# Patient Record
Sex: MALE | Race: WHITE | Employment: FULL TIME | URBAN - METROPOLITAN AREA
[De-identification: names, ages, dates, MRNs, and addresses within clinical notes are randomized per-mention and may not be internally consistent; named-entity substitution may affect disease eponyms.]

---

## 2018-12-05 ENCOUNTER — TRANSFERRED RECORDS (OUTPATIENT)
Dept: HEALTH INFORMATION MANAGEMENT | Facility: CLINIC | Age: 23
End: 2018-12-05

## 2018-12-21 ENCOUNTER — TRANSFERRED RECORDS (OUTPATIENT)
Dept: HEALTH INFORMATION MANAGEMENT | Facility: CLINIC | Age: 23
End: 2018-12-21

## 2018-12-27 ENCOUNTER — TELEPHONE (OUTPATIENT)
Dept: PSYCHIATRY | Facility: CLINIC | Age: 23
End: 2018-12-27

## 2018-12-27 NOTE — TELEPHONE ENCOUNTER
PSYCHIATRY CLINIC PHONE INTAKE     SERVICES REQUESTED / INTERESTED IN          Med Management    Presenting Problem and Brief History                              What would you like to be seen for? (brief description): Pt was referred by his psychiatrist who felt like he needed to be evaluated. He had an episode in the past, but his episodes have been managed by Abilify. He has had depression and anxiety for over 10 years. He started the Abilify 2 months ago and its helped a lot, but he doesn't feel anything anymore, he feels neurtral and emotionless. His first episode started about a year ago. He had major depression and paranoia which led to a suicide attempt and he was admitted to the hospital. Hallucinations are very rare, they started at year ago. He has auditory and visual but they are mostly auditory. The medication works well to keep him out of the depressive phases but when he had them it would last for a day. He gets a full night sleep, but wakes feeling tired and unrested. When he was released from the hospital and started seeing a psychiatrist. His current psychiatrist is interested in a second opinion to make sure his meds are working properly. He takes 15 mg of Abilify per day and Lexapro 30 mg per day.     Have you received a mental health diagnosis? Yes   Which one (s): Depression and Anxiety  Is there any history of developmental delay?  No   Are you currently seeing a mental health provider?  Yes            Who / month last seen:  Ron Garner at [facility]. Last seen on 12/21/18  Do you have mental health records elsewhere?  Yes  Will you sign a release so we can obtain them?  Yes    Have you ever been hospitalized for psychiatric reasons?  Yes  Describe:  See above    Do you have current thoughts of self-harm?  Not right now  Do you currently have thoughts of harming others?  No       Substance Use History     Do you have any history of alcohol / illicit drug use?  No  Describe:   NA  Have you ever received treatment for this?  No    Describe:  NA     Social History     Does the patient have a guardian?  Yes    Name / number: Frida Gibson (Parents) - [phone] (mom cell)  Have you had an ACT team in last 12 months?  No  Describe: NA   Do you have any current or past legal issues?  No  Describe: NA   OK to leave a detailed voicemail?  Yes    Medical/ Surgical History                                 There is no problem list on file for this patient.         Medications             No current outpatient medications on file.         DISPOSITION      12/27/18 Intake completed. Ok to schedule for FE appt. Scheduled w/ Eden Green on 1/8/19 at 12:00pm.  Melisa Hale,

## 2019-01-08 ENCOUNTER — OFFICE VISIT (OUTPATIENT)
Dept: PSYCHIATRY | Facility: CLINIC | Age: 24
End: 2019-01-08
Attending: SOCIAL WORKER
Payer: COMMERCIAL

## 2019-01-08 DIAGNOSIS — F32.3 MAJOR DEPRESSIVE DISORDER WITH PSYCHOTIC FEATURES (H): Primary | ICD-10-CM

## 2019-01-08 NOTE — Clinical Note
"Hi Roselia-Just reviewing your DA documentation.  Looks really good.  If he is going to return to his therapist in [place], it would be important for you to contact the therapist and provide a warm handoff of our assessment and recommendations.  I forwarded you an inbasket message with a records request and needed follow up.  Other thoughts-I think you could elaborate more on the prodromal period.  I have a smartphrase to help formulate your thoughts and you can delete out the rest:  . FEPprodromesx  Lastly, diagnosis.  Once you have one identified, you'll need to identify that in the \"Plan\" button to the left of the encounter.  Be sure to also enter billing code under the \"Wrap-up\" level of service.  "

## 2019-01-08 NOTE — PROGRESS NOTES
"First Episode Psychosis   [facility]  Diagnostic Assessment  [facility]      Madhu Gibson MRN# [id number]   Age: 23 year old YOB: 1995     Date of Evaluation: 1/08/19  90 minute evaluation    Contributors to the Assessment   Chart Reviewed.   Interview completed with Madhu Gibson.  Releases of information signed by Madhu for [facility](psychiatry)  Collateral information obtained from Madhu's mother, Caitlin Gibson.    Chief Complaint   \"I want to get my depression under control. \"    History of Present Illness    Madhu Gibson is a 23 year old male who presents for evaluation for First Episode of Psychosis, [facility] services for treatment of early psychosis.   Madhu initially received cognitive testing in fourth grade at the [facility] [employer] [facility] [employer] in [facility].  During that time, he was displaying behaviors at home and in school surrounding excessive worry and anger issues.  Ultimately, he was diagnosed with an anxiety disorder and it was recommended that a 504 plan be implemented at school. Madhu  indicated that he was was first diagnosed with Major Depression at age [age].  He began taking Lexapro several years ago to manage his symptoms. He saw his depressive symptoms intensify around age 20.5-22 when he was working full-time at [employer].   He was unable to identify a trigger that made the symptoms intensify, other than possible stress related to staffing issues at work. Madhu was hospitalized at [facility] in December 2017 following a suicide attempt via stabbing himself in the thigh.  After his hospitalization(age 22), he began to experience visual and auditory hallucinations. He kept the psychotic symptoms to himself for six months, until getting help in June 2018.  During that time, he started Abilify and it has helped.  Madhu has since seen two psychiatrists, and they have hypothesized that he may " "either be experiencing negative symptoms from schizophrenia or Major Depression with psychotic features.  Madhu reported that he believes that  Abilify causes him to often feels \"flat\" without any happiness or sadness. He recently switched to Olanzapine, and is increasing the dosage to 10mg. Madhu continues to have auditory hallucinations on a daily basis, but indicates that they are incoherent whispers.  He occasionally has visual hallucinations in the form of a black shadowy figure.  He reported delusions that he often believes that others are watching and following him.  He reports that he is able to self-regulate and do reality testing with the hallucinations, and that his priority pressing issue is the depressive symptoms.      Psychiatric Review of Systems (Completed M.I.N.I. Version 7.0.2: Yes)   A. DEPRESSION:  Past 2 Weeks: depressed mood, restlessness, feelings of worthlessness; thoughts of death(no plan or intent);distress of life areas; reports,\"I don't feel high or low, I just feel flat and numb\". Past Episode:  depressed mood, restlessness, feelings of worthlessness; thoughts of death(no plan or intent);distress of life areas;   B. SUICIDALITY: Madhu reports having \"fleeting thoughts\" approximately 1-2 times per week that he would be better off dead.  He indicates that he does not have a plan or intent to attempt suicide, and that he redirects himself with distractive techniques.  In December 2017, Madhu reports that he had a suicide attempt by stabbing himself in the thigh.  He indicated that during this time he called his sister, who escorted him to [facility] where he was for 2-3 days.  He indicated that he did not sustain any severe injury from the stab wound site.   Risk: Low: Denies suicidal intent or plan. Madhu reported on a scale 0-100%, he would be 10% likely to attempt suicide within the next three months.   C. WALT/HYPOMANIA:  Current Episode: none; Past Episode: " none  DENIES Current Episode: none; DENIES Past Episode: none   D. PANIC:  Reports having occasional panic attacks that result in heart racing, sweating/clammy hands, trembling, choking sensation, chest pain, and nausea.  He said during these incidents he feels as if he is losing control or going crazy.  Madhu reported that he has not had a panic attack for the past 2 or 3 years.   E. AGORAPHOBIA:  None  F. SOCIAL ANXIETY:  none   G. OBSESSIVE-COMPULSIVE:  none  H. TRAUMA:  none  I. ALCOHOL & J. NON-ALCOHOL:  Madhu drinks alcohol on an occasional basis, approximately 1 drink/week. See CAGE-AID below  K. PSYCHOSIS: Madhu reports that he is often paranoid, and believes that others are watching and following him.  He indicated that he last felt this way last week.  He has auditory hallucinations on a daily basis, and last experienced them this morning.  He has been having hallucinations since December 2017.  He reported that they are multiple voices that are whispering and the volume gets louder.  He said that most of the time he cannot make out what they are saying. He reported that he occasionally has visual hallucinations, and it is a black shadowy figure that appears in his peripheral vision.  He mentioned that the visual hallucinations are somewhat rare.  He is able to differentiate between reality and hallucinations, and can self-regulate without hospitalization during these times.   Madhu indicated that the hallucinations are stable throughout the day, and do not fluctuate based on his mood.  DENIES- catatonic symptoms and disorganized speech  L-M. EATING DISORDER:  none   N. GENERALIZED ANXIETY:    Madhu reported excessive anxiousness and worry over the past six months, and that they are present most days.  He indicated that this leads to restlessness and exhaustion, but does not experience muscle tension, difficulty concentrating, irritability, or difficulty sleeping.  He did not report that the anxiety and worry  "disrupt major areas of functioning in his life.   (O. RULE OUT MEDICAL, ORGANIC OR DRUG CAUSES FOR ALL DISORDERS)  P. ANTISOCIAL PERSONALITY:  none    Past Psychiatric History   Past diagnoses:General Anxiety Disorder, Major Depression   When did you first start experiencing psychotic symptoms: About a year ago(age 22)  Past Medication Trials: See recent [facility] visit with Sydnee Olvera    Hospitalizations: 12/2017 [facility]  Commitment: No, Current [place] order: No  ECT trials: No    Suicide attempts: Yes - Stabbed self in thigh Dec 2017  Self-injurious behavior: No  Violent behavior: No    Current Outpatient Programs & Services:  Psychotherapy: on hold (previous with Vincent Burgos, [facility])  Medication Mgmt: Dr. Scott Hyde/[facility]     Substance Use History: (review CAGE-AID assessment)   Madhu reported that he has never experimented with substance use other than consumption of occasional alcoholic beverage(1 drink/week)    CD treatment hx: No  Withdrawal hx: No  Current sober supports include n/a.         Past Medical History:    Primary Care Physician: Dontrell Baltazar A    History of seizures or head trauma/loss of consciousness? No           Allergies:    Allergies not on file         Medications:     No current outpatient medications on file.             Social History:    Living situation: Madhu lives with three roommates, in a Private Apartment in [place], [place].   Madhu has a supportive family, and both parents accompanied him to the clinic for the diagnostic assessment.  Madhu has an older sister, Steph, who lives with their parents.      Education: Madhu is not currently attending school;  He graduated high school in 2015 and completed some college.   During high-school, he was in mainstream classes and received average grades.  He described himself as a \"quiet student.\"  He also studied Computer Science in college for 1.5 years  before withdrawing.     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   Madhu reported that he has been on medical leave in the past for mental health reasons, and has started a new leave four days ago.     Finances: Madhu is financially supported by his payroll at [email or web address].     Relationships: Significant relationships include sister(Steph), parents, and his best-friend. Madhu does not  have a significant other.     Spiritual considerations:  Patient does not identify with trudi community    Cultural influences: Madhu identifies his race as . Madhu's primary language is English. Madhu identifies his sexual orientation as heterosexual, and his gender as male.  Madhu prefers he pronouns.  Madhu reports  No  to other cultural considerations to take into account when providing treatment.     Legal Hx: No    Trauma and/or Abuse Hx: No     Hx: No        Developmental History:   Madhu was born without pregnancy or delivery complications.   Madhu's parent/guardian denies in utero substance exposure. Madhu did meet developmental milestones on time. Madhu did not receive interventions for developmental delays. Madhu received cognitive testing in 4th grade after experiencing behavioral issues at school related to anger and anxiety.  Ultimately, he did not require an IEP during school.         Family History:   Family history of: Major Depression (sister, mom). Madhu's sister also has had a suicide attempt, and has been diagnosed with borderline personality disorder. Madhu's maternal side of the family has history of depression and bipolar disorder.         Most Recent Labs & Vitals (per EPIC):   No lab results found.  There were no vitals taken for this visit.    Screening/Assessment Measures   PHQ9 was completed today, 1/08/19  Scored at 6    GAD7 was completed today, 1/08/19  6 out of 21, indicating mild anxiety.    CAGE-AID was completed today, 1/08/19  None of the  patient's responses to the CAGE screening were positive / Negative CAGE score    Mental Status Exam   Alertness: alert   Appearance: adequately groomed  Behavior/Demeanor: cooperative, with fair  eye contact   Speech: normal  Language: good. Preferred language identified as English.  Psychomotor: normal or unremarkable  Mood: depressed  Affect: appropriate  Thought Process/Associations: unremarkable  Thought Content:  Reports paranoid ideation;  Denies obsessions   Perception:  Reports Auditory and Visual Hallucinations, Delusions    Insight: good  Judgment: good  Cognition: oriented: time, person, and place; formal cognitive testing was not done  Suicidal ideation: reports SI, denies intent,  and denies plan  Homicidal Ideation: denies    Provisional Psychiatric Diagnoses (M.I.N.I. 7.0.2 assessment)   F32.3 Major Depression, Recurrent episode, Severe with Psychotic Features        Assessment   Madhu Gibson is a 23 year old single (never )  male with psychiatric history of General Anxiety Disorder, Major Depression, suicide attempt, and a recent onset of psychosis who presented for a comprehensive assessment of psychotic symptoms by the First Episode of Psychosis Strengths Program.  Madhu was referred by his psychiatrist for comprehensive assessment. Madhu presented today as a Good historian with Good insight. The above duration of untreated psychosis was approximately six months.    It seems possible that prodromal symptoms may have began about a year ago when Madhu had a suicide attempt after experiencing stressors at work.    He went on a medical leave from work during this time, and began experiencing hallucinations/delusions soon after.    Diagnosis of Major Depression with Psychotic Features seems supported by patient report, collateral report, and the MINI 7.0.2. There are no medical comorbidities which impact this treatment     Psychosocial & Contextual Factors include Occupational  "Difficulties. Madhu has evidence of occupational decline, as he recently took another medical leave from work.  Previously, stressors related to work led to increased depressive symptoms and prompted the first medical leave. The goal is to increase social/vocational/occupational functioning detailed above in social history.  Identified risk factors and/or vulnerabilities include lack of structure/purposeful activity while on medical leave leading to increased depressive symptoms    Protective factors and/or strengths identified as has a stable living environment, has a supportive family, insight into mental health, and endorses future oriented thoughts.  Madhu is able to maintain long-term employment at [employer] full-time, and his supervisors are supportive of his mental health needs. Madhu has medical insurance and is motivated to be an active participant in his treatment planning.    Madhu is currently engaged in services in the community; including psychiatry and therapy. Writer has provided verbal and/or written information about the First Episode of Psychosis- Strengths Program and available services to consider.   Madhu and his family are interested in participating in the comprehensive assessment to provide information to his local providers, but would not like to enroll in NAVIGATE or STRENGTHS programs due to their location of residency in [place].     Madhu agrees to treatment with the capacity to do so. Agrees to call clinic for any problems. The patient understands to call 911 or come to the nearest ED if life threatening or urgent symptoms present.    Billing for \"Interactive Complexity\"?    No    Plan   Medication: Madhu was agreeable to seeing a Strengths medication prescriber.     Psychotherapy: Madhu was not interested in meeting with a therapist, as he may return to a therapist that he is established with in [place].      Supported Employment & Education: Madhu is employed full-time at [employer], and is " not in need of employment and education support.    Case Management: Madhu is not followed by a .  Case Management is not an identified need at this time.    Other Psychosocial Supports:  Due to location, Madhu is not interested in the  Young Adult Group. Family was provided information for  Family Psychoeducation & Support Group (Family email address [email or web address]).     Medical Referrals: n/a    Safety: Without the recommended intervention, Madhu is likely to experience possible increase in psychotic symptoms requiring hospitalization. Today Madhu denies SI, denies intent,  and denies plan. In addition, there are notable risk factors for self-harm, including suicidal ideation. However, risk is mitigated by history of seeking help when needed, commitment to family, future-oriented thoughts, and insight into mental health. Therefore, based on all available evidence including the factors cited above, Madhu does not appear to be at imminent risk for self-harm, does not meet criteria for a 72-hr hold, and therefore remains appropriate for ongoing outpatient level of care.  The patient convincingly denies suicidality on several occasions. There was no deceit detected, and the patient presented in a manner that was believable.  Safety plan was not discussed and included review of crisis phone numbers within the county of residence, and examples of when to contact them.  Additionally, discussed seeking assistance via 911 or local ED should patient begin to feel unsafe and have increased feelings of suicide.      HIPOLITO Payan Candidate        Attestation:  I have reviewed this note, but did not meet with the patient directly. This patient is discussed with me in individual social work supervision. I agree with the plan as documented.  HIPOLITO Villagomez, [facility],  January 31, 2019    I did not see this pt directly. This pt was discussed with me in individual clinical social work supervision,  and I agree with the plan as documented.    Veronica Wolfe, HIPOLITO, LICSW February 7, 2019

## 2019-01-18 ENCOUNTER — TELEPHONE (OUTPATIENT)
Dept: PSYCHIATRY | Facility: CLINIC | Age: 24
End: 2019-01-18

## 2019-01-18 ENCOUNTER — OFFICE VISIT (OUTPATIENT)
Dept: PSYCHIATRY | Facility: CLINIC | Age: 24
End: 2019-01-18
Attending: PSYCHOLOGIST
Payer: COMMERCIAL

## 2019-01-18 ENCOUNTER — OFFICE VISIT (OUTPATIENT)
Dept: PHARMACY | Facility: CLINIC | Age: 24
End: 2019-01-18
Payer: COMMERCIAL

## 2019-01-18 DIAGNOSIS — F29 PSYCHOSIS, UNSPECIFIED PSYCHOSIS TYPE (H): Primary | ICD-10-CM

## 2019-01-18 DIAGNOSIS — E56.9 VITAMIN DEFICIENCY: ICD-10-CM

## 2019-01-18 PROCEDURE — 99605 MTMS BY PHARM NP 15 MIN: CPT | Performed by: PHARMACIST

## 2019-01-18 PROCEDURE — 99607 MTMS BY PHARM ADDL 15 MIN: CPT | Performed by: PHARMACIST

## 2019-01-18 RX ORDER — LORATADINE 10 MG/1
10 TABLET ORAL PRN
Status: ON HOLD | COMMUNITY
Start: 2011-03-14 | End: 2019-02-01

## 2019-01-18 RX ORDER — OLANZAPINE 10 MG/1
10 TABLET ORAL AT BEDTIME
Refills: 0 | Status: ON HOLD | COMMUNITY
Start: 2018-12-28 | End: 2019-02-01

## 2019-01-18 RX ORDER — ESCITALOPRAM OXALATE 20 MG/1
30 TABLET ORAL AT BEDTIME
Status: ON HOLD | COMMUNITY
Start: 2018-08-03 | End: 2019-02-01

## 2019-01-18 NOTE — PATIENT INSTRUCTIONS
Recommendations from today's MTM visit:                                                        1. Ok to start OTC fishoil and NAC (N-acetylcystine). For NAC aim for 2-2.4g total per day, you can take two times daily. For fish oil, aim for 2g EPA+DHA total per day, you can also take this two times daily. A good brand for fish oil is [employer] [employer] Ultimate Omega.     2. Next appt in clinic is 2/1/19 with Dr. Mendoza    To schedule another MTM appointment, please call the clinic directly or you may call the MTM scheduling line at [phone] or toll-free at [phone].     My Clinical Pharmacist's contact information:                                                      It was a pleasure talking with you today!  Please feel free to contact me with any questions or concerns you have.      Sydnee Olvera, PharmD, BCPP  Medication Therapy Management Pharmacist  [facility]    You may receive a survey about the MTM services you received.  I would appreciate your feedback to help me serve you better in the future. Please fill it out and return it when you can. Your comments will be anonymous.

## 2019-01-18 NOTE — TELEPHONE ENCOUNTER
On 1/18/2019, 32 pages of records were received from [facility]. This writer put the records in [facility]'s folder upfront and this was routed to [facility].  I sent the documents to scanning on 1/18/2019 and held a copy in Psychiatry until scanning is confirmed. Jenae Luis, CMA

## 2019-01-18 NOTE — PROGRESS NOTES
SUBJECTIVE/OBJECTIVE:                           Madhu Gibson is a 23 year old male coming in for an initial visit for Medication Therapy Management.  He is here today with his father, Brian.  He was referred to me from First Episode Psychosis (FEP[facility].      Chief Complaint: FEP intake and medication review.    Allergies/ADRs: Reviewed in Epic  Tobacco: see DA, not discussed today.   Alcohol: see DA, not discussed today.   PMH: unremarkable    Medication Adherence/Access:  no issues reported    Psychosis, NEC: Pt here today for continuation of FEP intake. He was referred to FEP program by Dr. Duong in [place].  Diagnostic Assessment (DA) completed with [facility] on 1/8/19. MTM visit and cognitive testing today. Initial psychiatry provider visit with Edgard Mendoza scheduled for 2/1/19.     Current therapy includes: Lexapro 30 mg daily, olanzapine 10 mg nightly, and metformin 500 mg daily (prescribed twice daily) to prevent antipsychotic-induced weight gain.    See 1/8/2019 DA for details regarding symptoms and history. During visit today, Madhu reported onset of psychosis symptoms starting ~1 year ago. He experienced auditory hallucinations of whispering. AH gradually worsened and were occurring daily.  He also endorsed visual hallucinations of shadowy figures and paranoia regarding feeling like people were watching him.    Madhu reports improvement in A/V hallucinations and paranoia starting ~Sept 2018 with initiation of Abilify therapy.  Abilify was titrated to 15mg, however was subsequently cross-tapered to Zyprexa in Dec 2018. Madhu and father are unclear as to why this medication change was made.  Both feel Abilify therapy was effective and dad recalls pt was more alert and less sedated with Abilify. Madhu does not recall experiencing any Abilify side effects. Review of outside records indicate he may have experienced some restless sleep with Abilify (was taking QHS).     Currently, Madhu  reports auditory hallucinations occurring 3-4 times per week (reduced from daily AH) and almost complete resolution of VH.  Madhu states his most problematic symptoms at this time are decreased interest, low energy/sedation, low motivation, low mood.  He was told these could be depression symptoms or negative symptoms of psychosis.  His primary goal is improving these negative symptoms and getting back to his job at [employer].    In terms of current therapy, he has been on olanzapine since 12/28/19 and on the current dose of 10 mg for ~2 weeks.  He endorses significant daytime sedation and is sleeping from 12AM-3/4PM.  He denies any other olanzapine side effects including increased appetite.  Metformin was started at the same time as olanzapine to prevent weight gain.  He is only taking it once daily d/t only eating 1 meal per day.  He has been on Lexapro for several years and has been at current dose of 30 mg daily for at least 1 year.  He feels Lexapro was helpful for depression when initially started.    Past medication trials:     Antipsychotics:   - Abilify up to 15mg daily, 3 month duration (9/2018-12/2018).   - Zyprexa up to 10mg (current)    Antidepressants:   - Lexapro up to 30mg daily, duration of several years (current)     Supplements/Vitamin D deficiency: Currently taking vitamin D 5000units daily- recommended by pt's psychiatrist d/t low vitamin D level (17ng/ml on 12/14/18).  Dad asks if there are other vitamins or supplements that would be helpful for Madhu's current symptoms.      ASSESSMENT:                             Current medications were reviewed today.     Medication Adherence: good    Psychosis, NEC: Current therapy with Zyprexa+Lexapro is somewhat effective, however is causing side effect of significant sedation. Madhu is sleeping 15-16 hours per night.  He reportedly felt better on Abilify therapy.  Reviewed available outside records (scanned into media tab), however documentation does not  include information regarding Abilify to Zyprexa switch.  Unclear as to reason(s) Abilify was stopped, but suspect restlessness with QHS administration and some ongoing, yet improved, AH.     Provided psychoeducation to Madhu and dad regarding psychosis and antipsychotic medication options.  Reviewed varying rates of side effects among agents. Also discussed that sometimes sedation to Zyprexa improves with time. If pt continues to experience significant sedation, could consider trial of a less sedating antipsychotic. Depending on reason for Abilify discontinuation, could consider Abilify retrial with targeting higher dose of 20-30mg daily with QAM as opposed to QHS dosing. If trial of an alternate agent is deemed more appropriate, risperidone or Geodon are potential options as well.     If Zyprexa is continued, could consider changing metformin IR to metformin XR for improved compliance with once daily dosing.     Supplements/Vitamin D deficiency: Stable. Agree with continuing vitamin D 5000units daily based on last level. Provided psychoed on benefits of vitamin on mood symptoms. Provided education on potential benefit of fish oil and NAC on negative/cognitive symptoms in FEP.  Discussed proposed mechanisms of action (anti-inflammatory, glutamate action), dosing, and efficacy shown in some studies.  Goal dose for fish oil is 2g EPA+DHA/per day and 1800mg to 2400mg per day of NAC. Both are available OTC. Fish oil formulations/concentrations vary widely and Powered is a recommended high potency EPA/DHA brand of FO. Brian expressed understanding with information provided.     PLAN:                            1. Will discuss medication options with FEP team. Given significant sedation with Zyprexa, could consider Abilify retrial or another less-sedating SGA.     2. If metformin is continued for prevention of SGA induced weight gain, may consider changing to XR formulation for improved compliance.     3. Provided  information on fish oil and NAC for negative/cognitive psychosis symptoms.     I spent 60 minutes with this patient today. A copy of the visit note was provided to the patient's FEP provider.    Will follow up in 3 weeks - FEP Explanation of Findings Visit.    The patient was given a summary of these recommendations as an after visit summary.     Chart documentation was completed in part with Dragon voice-recognition software. Even though reviewed, some grammatical, spelling, and word errors may remain.    Sydnee Olvera, PharmD, BCPP  Medication Therapy Management Pharmacist  [facility]  [phone]

## 2019-01-18 NOTE — PROGRESS NOTES
"Name: Madhu Gibson  MRN: [id number]  Date: 01/18/2019  Time: 8:15am  Location: [facility]  Diagnosis: F29 Psychosis  Length: 90 minutes cognitive test administration; 45 minutes scoring & interpretation      Reason for Referral: Madhu was referred to the First Episode Strengths Program by his psychiatrist for diagnostic clarification.  He reports experiencing anxiety, mood symptoms and several psychosis-related symptoms including visual and auditory hallucinations.  He reported that his concentration has been \"fine\" but his memory has been \"not the greatest.\"           Mental Status:                         General appearance: Casually dressed, appropriately groomed                         Mood: Reported as \"neutral,\" neutral affect                        Cognition: Formally assessed - see below                        Orientation: x3                        Insight: Moderate                        Memory: Reported as \"not the greatest\"                        Psychosis: Auditory and visual hallucinations                         Suicidal / Homicidal Thoughts: Denied      Interpretation of Findings:   Wechsler Adult Intelligence Scale-Fourth Edition (WAIS-IV):  Mr. Gibson was administered the WAIS-IV to assess his overall cognitive functioning. The WAIS-IV yields five composite scores: the Verbal Comprehension index, the Perceptual Reasoning index, the Working Memory index, the Processing Speed index, and Full Scale IQ.  Mr. Gibson appeared fatigued during testing as evidenced by frequent yawning, but requested all offers for breaks.  Mr. Gibson achieved a Full Scale IQ of 102 (95% confidence interval: 98 to 106), corresponding to the 55th percentile and Average range of ability.      On the Verbal Comprehension Index, a measure of verbal reasoning abilities, verbal concept formation, and knowledge acquired from the environment, he achieved a score in the Average range of ability (VCI composite score of 107; 68th " percentile).  Mr. Gibson performed in the Average range of ability across tasks that make up this composite. The tasks include the ability to answer general factual inquiries, define words, and draw conceptual similarities between words. As such, Mr. Gibson s nonverbal reasoning abilities are comparable to those of the average, same-aged peer.      On the Perceptual Reasoning index, which assesses non-verbal problem solving and spatial planning abilities, Mr. Gibson achieved a score in the Average range of ability (JIMBO composite score of 109, 73rd percentile).  Mr. Gibson performed in the Average range of ability across tasks that make up this composite.  These tasks required him to replicate a series of spatial designs using blocks, complete visual patterns based upon presented information and a task that had him select from multiple pieces to mentally create a desired target shape.  Mr. Gibson s perceptual reasoning abilities are comparable to those of the average, same-aged peer.      His short-term memory ability was assessed by the Working Memory index, on which he performed in the Average range of ability (WMI composite score of 102, 55th percentile).  Memory ability was assessed using a task which required the retention and manipulation of a string of numbers, as well as a relatively basic oral arithmetic exam.  Mr. Gibson s ability to store things in the short-term memory and recall later is similar to peers of the same age.    Mr. Gibson s ability to quickly process basic visual information was measured by the Processing Speed index, on which he performed in the Low Average range of ability (composite score of 84, 14th percentile).  Mr. Gibson performed in the Low Average to Average range of ability across tasks that make up this composite.  These tasks required him to visually scan and discriminate information under time constraints.  Mr. Gibson s processing speed abilities are somewhat lower compared to those of  the average, same-aged peer.        Overall, Mr. Gibson s performance on the WAIS-IV fell in the Average range of ability as compared to his same-aged peers.  Mr. Gibson s general cognitive profile suggests intact cognitive functioning, will within the normal limits for his age.    Wechsler Adult Intelligence Scale-Fourth Edition (WAIS-V):  Index/Subtest Scaled Score Index/Subtest Scaled Score         Verbal Comprehension  Perceptual Reasoning    Similarities 9 Block Design 13   Vocabulary 13 Matrix Reasoning 11   Information 12 Visual Puzzles 11    JIMBO 109   Working Memory  Processing Speed    Digit Span 9 Symbol Search  8   Arithmetic 12 Coding 6    PSI 84           Full Scale            Note: Scaled scores between 8-12 are considered to be within the average range.  It is important to note that cognitive ability is considered to be one aspect of intelligence (i.e. other aspects may include interpersonal, intrapersonal, kinesthetic, musical ability, etc.); however, this type of intellectual ability is most correlated with academic performance. In addition, such measures of cognitive ability should be considered snapshots of functioning at the current time and may be influenced by development, exposure to learning, and other factors over time.  No test is a perfect measure of abilities, skills, knowledge, or behavior. Confidence intervals represent a range of scores in which there is a 95% probability that his score would fall if he were to be tested repeatedly over time.      Self-Report Questionnaires:  Mr. Gibson self-reports experiencing a severe level of depression/sadness and lack of motivation or pleasure.  He also self-reports a moderate amount of problems with physical and sleep problems, and inability to function at work or home.  He reports a moderate level of perceived stress and no concerns with substance or alcohol use.  Mr. Gibson reports being physically active and denies nutrition  concerns.  Additionally, he self-reports few adverse childhood events and mainly utilizes the coping mechanisms of instrumental support, acceptance, and self-distraction.  Mr. Gibson reports a low level of personal wellbeing and with regard to sensory symptoms, reports some difficulty with distractibility and fatigue-stress modulation.  He reports few trauma symptoms and has strong insight.      I met this patient and participated in the Mental Status Examination portion of the appointment. Additionally, this patient was discussed with me in individual psychotherapy supervision, and I agree with the plan above as documented.  Cheyenne Gutierrez Psy.D., L.P., 01/31/2019.    Psychological testing was administered on 01/18/2019 by Lisa Horta, Doctoral Practicum Student/Psychology Learner, under my direct supervision. Total time spent in test administration and scoring by Clinical Trainee was 2 hours and 15 minutes (90 minutes administering, 15 minutes scoring and 30 minutes interpreting). See Above Clinical Trainee Note for testing details.  Cheyenne Gutierrez, 04/19/2019.

## 2019-01-23 ENCOUNTER — TELEPHONE (OUTPATIENT)
Dept: PSYCHIATRY | Facility: CLINIC | Age: 24
End: 2019-01-23

## 2019-01-23 NOTE — TELEPHONE ENCOUNTER
On 12/21/2018 the patient signed a CHIDI to release records from [facility] to [facility] for the purpose of care coordination, Exchange of information: obtain or disclose per CHIDI. This writer sent the CHIDI to medical records via tube system and kept a copy in psychiatry until scanning is complete/confirmed. Angie Brink MA

## 2019-01-25 ENCOUNTER — TELEPHONE (OUTPATIENT)
Dept: PSYCHIATRY | Facility: CLINIC | Age: 24
End: 2019-01-25

## 2019-01-31 PROBLEM — F29 PSYCHOSIS, UNSPECIFIED PSYCHOSIS TYPE (H): Status: ACTIVE | Noted: 2019-01-31

## 2019-02-01 ENCOUNTER — TELEPHONE (OUTPATIENT)
Dept: PSYCHIATRY | Facility: CLINIC | Age: 24
End: 2019-02-01

## 2019-02-01 ENCOUNTER — OFFICE VISIT (OUTPATIENT)
Dept: PSYCHIATRY | Facility: CLINIC | Age: 24
End: 2019-02-01
Attending: PSYCHIATRY & NEUROLOGY
Payer: COMMERCIAL

## 2019-02-01 ENCOUNTER — HOSPITAL ENCOUNTER (OUTPATIENT)
Facility: CLINIC | Age: 24
Discharge: HOME | End: 2019-02-01
Attending: PSYCHIATRY & NEUROLOGY | Admitting: PSYCHIATRY & NEUROLOGY
Payer: COMMERCIAL

## 2019-02-01 VITALS — DIASTOLIC BLOOD PRESSURE: 73 MMHG | HEART RATE: 83 BPM | SYSTOLIC BLOOD PRESSURE: 131 MMHG | WEIGHT: 261.8 LBS

## 2019-02-01 DIAGNOSIS — F29 PSYCHOSIS, UNSPECIFIED PSYCHOSIS TYPE (H): Primary | ICD-10-CM

## 2019-02-01 DIAGNOSIS — F32.3 MAJOR DEPRESSIVE DISORDER WITH PSYCHOTIC FEATURES (H): Primary | ICD-10-CM

## 2019-02-01 PROBLEM — R45.851 SUICIDAL IDEATION: Status: ACTIVE | Noted: 2019-02-01

## 2019-02-01 PROCEDURE — 99238 HOSP IP/OBS DSCHRG MGMT 30/<: CPT | Mod: GC | Performed by: PSYCHIATRY & NEUROLOGY

## 2019-02-01 PROCEDURE — G0463 HOSPITAL OUTPT CLINIC VISIT: HCPCS | Mod: ZF

## 2019-02-01 PROCEDURE — 12400001 ZZH R&B MH UMMC

## 2019-02-01 RX ORDER — OLANZAPINE 10 MG/1
10 TABLET ORAL AT BEDTIME
Qty: 30 TABLET | Refills: 0 | Status: SHIPPED | OUTPATIENT
Start: 2019-02-01 | End: 2019-02-08

## 2019-02-01 RX ORDER — OLANZAPINE 10 MG/2ML
10 INJECTION, POWDER, FOR SOLUTION INTRAMUSCULAR
Status: DISCONTINUED | OUTPATIENT
Start: 2019-02-01 | End: 2019-02-02 | Stop reason: HOSPADM

## 2019-02-01 RX ORDER — TRAZODONE HYDROCHLORIDE 50 MG/1
50 TABLET, FILM COATED ORAL
Status: DISCONTINUED | OUTPATIENT
Start: 2019-02-01 | End: 2019-02-02 | Stop reason: HOSPADM

## 2019-02-01 RX ORDER — ACETAMINOPHEN 325 MG/1
650 TABLET ORAL EVERY 4 HOURS PRN
Status: DISCONTINUED | OUTPATIENT
Start: 2019-02-01 | End: 2019-02-02 | Stop reason: HOSPADM

## 2019-02-01 RX ORDER — LORATADINE 10 MG/1
10 TABLET ORAL PRN
Qty: 30 TABLET | Refills: 0 | Status: SHIPPED | OUTPATIENT
Start: 2019-02-01 | End: 2019-03-03

## 2019-02-01 RX ORDER — ESCITALOPRAM OXALATE 20 MG/1
30 TABLET ORAL AT BEDTIME
Qty: 45 TABLET | Refills: 0 | Status: SHIPPED | OUTPATIENT
Start: 2019-02-01 | End: 2019-02-22

## 2019-02-01 RX ORDER — HYDROXYZINE HYDROCHLORIDE 25 MG/1
25 TABLET, FILM COATED ORAL EVERY 4 HOURS PRN
Status: DISCONTINUED | OUTPATIENT
Start: 2019-02-01 | End: 2019-02-02 | Stop reason: HOSPADM

## 2019-02-01 RX ORDER — OLANZAPINE 10 MG/1
10 TABLET ORAL
Status: DISCONTINUED | OUTPATIENT
Start: 2019-02-01 | End: 2019-02-02 | Stop reason: HOSPADM

## 2019-02-01 ASSESSMENT — PAIN SCALES - GENERAL: PAINLEVEL: NO PAIN (0)

## 2019-02-01 ASSESSMENT — ACTIVITIES OF DAILY LIVING (ADL)
HYGIENE/GROOMING: INDEPENDENT
ORAL_HYGIENE: INDEPENDENT
LAUNDRY: WITH SUPERVISION
DRESS: INDEPENDENT

## 2019-02-01 NOTE — TELEPHONE ENCOUNTER
Writer also notified that pt needs letter to excuse him from work tomorrow. Writer drafted letter and placed in provider's folder for review and signature.

## 2019-02-01 NOTE — TELEPHONE ENCOUNTER
Writer received notification that pt needs to be hospitalized due to increased SI and psychosis.     Writer helped coordinate the admission process. Dr. Quarles approved of admission on station 22. Report given to DODIE Jean over the phone. Pt transferred to station 22 with parents and security.

## 2019-02-01 NOTE — PROGRESS NOTES
"[facility] PSYCHIATRY CLINIC MEDICAL DIAGNOSTIC ASSESSMENT       CARE TEAM:  PCP- Dontrell Baltazar    Specialty Providers- EFREN Lopez ([facility])    Therapist- yes, private practice in [facility] Team- no.    Madhu Gibson is a 23 year old male who prefers the name Madhu & pronouns he, him.    Referred by:  Outpatient psychiatrist  Referred for evaluation of:  Psychosis, depression  History Provided by:  patient and parents who were good historians    CHIEF COMPLAINT                                                                             \" I'm tired of feeling empty all the time \"     HISTORY OF PRESENT ILLNESS                                 4, 4      Pertinent Background: This patient first experienced symptoms of depression around the age 10 or 11 and has received treatment since that time. Limited med exposure. Developed symptoms of psychosis and had suicide attempt in December 2017 and remained depressed/psychotic for approximately 1 year before referral to first episode psychosis program here at the [facility].    Psych critical item history includes suicide attempt [single], suicidal ideation, psychosis [sxs include AH, VH, possible paranoia] and psych hosp (<3).    .   MOST RECENT HISTORY    Madhu reports symptoms of depression and psychosis starting approximately 13 months ago in December 2017 following a suicide attempt and brief hospitalization.  Since that time he has continued to feel depressed, describing symptoms of anergia, anhedonia, low mood, feelings of hopelessness, and suicidal ideation.  Patient has had thoughts about trying to stab himself again as in the first suicide attempt.  He describes auditory hallucinations consisting of \"background chatter.\"  There are no discernible voices or content to the voices.  Sometimes they sounds get louder when he is stressed.  He also describes visual hallucinations.  And the VH were originally in his visual periphery but have since " "moved towards the center of his vision.  He says that he both sees dark shadows periodically and sometimes sees \"ripples in the walls.\"  He also describes a vague feelings of anxiousness and worry that something bad will happen.  Patient says that it is \"sometimes hard to think\" when he is having active auditory hallucinations.  Auditory hallucinations are on a daily basis and visual are less frequent.  Patient also describes difficulties with sleep onset and maintenance.  He is oversleeping.  He says that he is tired of feeling empty and depressed.  He says he does not feel anything.  Because of feelings of emptiness, he has frequently had thoughts about trying to kill himself.    Patient is unsure about whether he may need hospitalization today.  He says he does not intend to harm himself today but is worried that that may change.    Patient reports symptoms of depression dating back to age 10 or 11.  He he does not remember any specific prompts to the depressive symptoms but remembers feeling empty and disinterested in most everything.  He is only been on one antidepressant, escitalopram 30 mg.  He was started on aripiprazole approximately 4-6 months ago, thinks it was about 15 mg.  Did not notice any changes in psychotic symptoms or mood symptoms.  He was switched to olanzapine about 1 month ago.  He does not notice any significant improvements though dad did remark that perhaps he seemed a little bit better about 2 weeks ago.  Patient is primarily concerned with the significant sedation he has experienced with the olanzapine initiation.    RECENT SYMPTOMS:   DEPRESSION:  reports-suicidal ideation, depressed mood, anhedonia, low energy, hypersomnia, poor concentration /memory, feeling worthless, feeling hopeless and feeling trapped;  DENIES- excessive crying  WALT/HYPOMANIA:  reports-none;  DENIES- increased energy, decreased sleep need and increased activity  PSYCHOSIS:  reports-auditory hallucinations, visual " hallucinations and negative symptoms (avolition, affective flattening, anhedonia, alogia, apathy, social withdrawal);  DENIES- disorganized behavior  DYSREGULATION:  reports-none;  DENIES- SIB, mood dysregulation, aggressive and physically agitated  ANXIETY:  excessive worry  SLEEP:  As above     RECENT SUBSTANCE USE:     ALCOHOL- Drinks about 1 beer every month      TOBACCO- no     CAFFEINE- Not discussed at this time  OPIOIDS- no         NARCAN KIT- N/A        CANNABIS- no            OTHER ILLICIT DRUGS- none      CURRENT SOCIAL HISTORY:  FINANCIAL SUPPORT- Currently working part-time at a [employer] though would be working full-time if not for symptoms of mental illness       CHILDREN- no      LIVING SITUATION- lives with 3 friends in apt near [facility]      SOCIAL/ SPIRITUAL SUPPORT- parents, friends     FEELS SAFE AT HOME- no, persistent SI     MEDICAL ROS (2,10):  Reports A comprehensive review of systems was performed and is negative other than noted in the HPI.      Denies A comprehensive review of systems was performed and is negative other than noted in the HPI.    SUBSTANCE USE HISTORY                                       Past Use- none reported  Treatment- #- no   Most Recent- N/A  Medical Consequences- no  HIV/Hepatitis- no  Legal Consequences- no     PSYCHIATRIC HISTORY     SIB- unknown  Suicidal Ideation Hx- yes, See above  Suicide Attempt- #- yes, See above, most recent- December 2017      Violence/Aggression Hx- no  Psychosis Hx- yes, As described above  Psych Hosp- #- 1 psychiatric hospitalization at [facility], most recent- December 2017   ECT- no    Eating Disorder: unknown  Outpatient Programs [ DBT, Day Treatment, Eating Disorder etc] : no    SOCIAL and FAMILY HISTORY                        patient reported                                 1ea, 1ea   Financial Support- documented above  Living Situation/Family/Relationships- documented above;  Children- documented above      "                            Trauma History (self-report)- Denies  Legal- no  Cultural/ Social/ Spiritual Support- family, friends  Early History/Education- B average student in HS. [place] to [employer] (near [place]). Some CS classes at [employer], but \"wasn't feeling it\" and withdrew, likely because of chronic symptoms of depression.    FAMILY MENTAL HEALTH HX- Mom with depression. Sister with borderline/mood disorders. Sister has had suicide attempts (no psychosis though). Mother has cousin with possible bipolar disorder.    PAST PSYCH MED TRIALS     Currently olanzapine and escitalpram  H/o aripiprazole    MEDICAL / SURGICAL HISTORY                                   Neurologic Hx- none reported   Patient Active Problem List   Diagnosis     Psychosis, unspecified psychosis type (H)     Suicidal ideation       No past surgical history on file.     ALLERGY                                Erythromycin-sulfisoxazole  MEDICATIONS                               No current outpatient medications on file.     VITALS                                                                                                                            3, 3   /73   Pulse 83   Wt 118.8 kg (261 lb 12.8 oz)    MENTAL STATUS EXAM                                                                                    9, 14 cog gs     Alertness: alert  and oriented  Appearance: casually groomed  Behavior/Demeanor: passive, with fair  eye contact   Speech: slowed  Language: intact  Psychomotor: slowed  Mood: depressed and anxious  Affect: flat; was congruent to mood; was congruent to content  Thought Process/Associations: linear, organized  Thought Content:  Reports suicidal ideation;  Denies preoccupations, obsessions , magical thinking and paranoid ideation  Perception:  Reports auditory hallucinations and visual hallucinations;  Denies depersonalization and derealization  Insight: adequate  Judgment: adequate for safety  Cognition: (6) " "does  appear grossly intact; formal cognitive testing was not done  Gait and Station: unremarkable    LABS and DATA     AIMS:  not done at this time    PHQ9 TODAY = not completed    No flowsheet data found.    PSYCHIATRIC DIAGNOSES                                                                    Major Depressive Disorder, Recurrent, Severe; with psychotic features  R/o Schizophrenia Spectrum Disorder such as Schizoaffective, Depressed Type     ASSESSMENT                                                                                  m2, h3     Madhu Gibson is a 23-year-old  male with a history of childhood onset depressive symptoms who presents for evaluation of psychotic symptoms that emerged following a suicide attempt approximately 13 months ago. He was referred by his primary outpatient psychiatrist in [place] for second opinion/consultation.  Patient has a long history of depressive symptoms including anergia, anhedonia, low mood, and sleep disturbances.  The symptoms evolved to include psychotic symptoms that have lingered despite treatment with combination of antidepressant and antipsychotic meds.  He apparently had limited to no response with aripiprazole and is only had a very small improvements, if any, with recent change to olanzapine.  He is also describing suicidal thoughts, sometimes active with a plan to stab himself, because he is \"tired of feeling empty all the time.\"  His family history is noted for several mood disorders including borderline, depression, and bipolar disorder.  He does not have any medical comorbidities.  On exam, he presents as very flat, withdrawn, and depressed.  His cognition, mood is slowed, is linear, organized, and goal-directed.    We discussed various treatment options but ultimately elected to pursue inpatient hospitalization and ideally ECT.  We did discuss risks and benefits of ECT and emphasized that this would likely be the fastest and most of the treatment " option for him.    [place] PRESCRIPTION MONITORING PROGRAM [] was not checked today:  will be checked next visit.    PSYCHOTROPIC DRUG INTERACTIONS:   Concurrent use of ESCITALOPRAM and QT INTERVAL-PROLONGING DRUGS may result in increased risk of QT-interval prolongation. .  MANAGEMENT:  periodic EKGs     PLAN                                                                                          m2, h3     ADMIT TO PSYCHIATRY    1) PSYCHOTROPIC MEDICATIONS:  - Continue olanzapine 10mg daily  - Continue escitalopram 30mg    2) THERAPY:    Can continue with individual psychotherapist for now    3) NEXT DUE:    Labs- needs antipsychotic labs  EKG- needs updating, given interaction betwen OLZ and escitalopram  Rating Scales- needs AIMS    4) REFERRALS:    Referred to inpatient psychiatric services for voluntary admission for psychotic depression   Patient discussed with Trinity Quarles MD who accepted pt for admission to [facility]    5) RTC: within 1-2 weeks of discharge from hospital    6) CRISIS NUMBERS:   Provided routinely in AVS.   [facility] [phone] (clinic)    [phone] (after hours)  ONLY if a [facility] PT: [facility] [phone] (clinic), [phone] (after hours)     TREATMENT RISK STATEMENT:  The risks, benefits, alternatives and potential adverse effects have been discussed and   are understood by the pt. The pt understands the risks of using street drugs or alcohol. There are no medical contraindications, the pt agrees to treatment with the ability to do so. The pt knows to call the clinic for any problems or to access emergency care if needed.  Medical and substance use concerns are documented above.  Psychotropic drug interaction check was done, including changes made today.    PROVIDER: Huber Mendoza MD    Patient staffed in clinic with Dr. Francisco who will sign the note.  I saw the patient with the resident, and participated in key portions of the service, including the mental  status examination and developing the plan of care. I reviewed key portions of the history with the resident. I agree with the findings and plan as documented in this note.  While this pt has not had trials of other antidepressants, he has been suffering with depression and psychosis for over a year even in the face of two antipsychotic trials. There has been a suicide attempt and he still has ongoing SI. We think an assessment for ECT, while benefiting from the safety of an inpt stay, is the optimal intervention at this time. I told the family that the inpt stay maybe result in ECT or new meds or both. Going inpt is by no means a commitment to ECT. The pt is quite uncertain about ECT, but is willing to consider it. This is a voluntary admission. We are concerned for pt safety, and feel the depression/ psychosis need immediate attention, thus the reason for admission.  Adeline Francisco

## 2019-02-01 NOTE — PLAN OF CARE
Madhu admitted [id number] directly from psych clinic-seen by Dr Mendoza outpt-safety search completed-oriented to unit and program-ate lunch-signed vol consent for admission-met with Dr Quezada-orders obtained-Dr Devine on unit to discuss ECT-Prior to completion of admit profile, Madhu requested discharge stating he does not want to have ECT-Dr Quarles present on unit-met with Madhu and approved discharge-medications ordered- Addendum:Madhu discharged care of parents 1750-30 day supply of medications in his possession

## 2019-02-01 NOTE — TELEPHONE ENCOUNTER
Writer received incoming phone call from pt's mother, Lisbet. She reports the pt has changed his mind and does not want to go forward with ECT or hospitalization. Parents are on their way to pick him up from the hospital now. Mom is wondering if the pt should return for another appt in clinic. Today's appt was a one time consult, so uncertain whether another appt is warranted or not. Agreed to discuss everything with the provider and then call her back.

## 2019-02-01 NOTE — NURSING NOTE
Chief Complaint   Patient presents with     Eval/Assessment     Psychosis, unspecified psychosis type

## 2019-02-01 NOTE — PROGRESS NOTES
Patient admitted voluntary status, but requesting to leave now.   Patient spoke with attending who supports patient's decision.   Pt denies any thoughts of self harm.   Patient arranged PU by parents.

## 2019-02-01 NOTE — LETTER
February 1, 2019      RE: Madhu Gibson  [address] [place] [address]         To Whom it May Concern,    I am writing on behalf of Madhu Gibson whom I recently evaluated and hospitalized at the [facility] to receive treatment for a medical condition. Please let this letter serve as notification that Madhu will need accommodations in the form of excused time from work from 2/1/19 until further notice.     If you have any further questions regarding this letter, please call the clinic at the number above.      Sincerely,      Huber Mendoza MD

## 2019-02-01 NOTE — H&P
"History and Physical    Madhu Gibson MRN# [id number]   Age: 23 year old YOB: 1995     Date of Admission:  2/1/2019          Contacts:   Primary Outpatient Psychiatrist:  - Edgard Rice MD- [facility]  - GAYLA Lopez ([facility])  Primary Physician:  Dontrell Baltazar  Therapist: Yes (Private practice in [facility])  [facility] : None  Probation/: None  Family Members: Mother: Carmen- [phone].  Father, Brian: [phone]         Chief Complaint:   \"The depression is bad\"         History of Present Illness:   History obtained from patient interview, parent interview, and chart review.  Pt interviewed on 02/01/19 at approximately 2:00pm.    This patient is a 23 year old male with who presented as a direct admit from first episode psychosis clinic with worsening depression, anxiety, and passive suicidal ideation on 02/01/2019.   He was medically cleared for admission to inpatient psychiatric unit.    Per chart review, Madhu has a chronic mental health history.  He was diagnosed with depression and anxiety around the age of 13 and began taking Lexapro at this time to manage his symtpoms.  His depressive episodes were well controlled until the age of [age] when he was working full-time at [employer] and was under stress due to staffing issues at work.  He was hsopitalized at [facility] in December of 2017 (age 22) after he attempted sucide by stabbing himself in the thigh.  Following this hospitalization, he began experiencing auditory and visual hallucinations.  He did not share these symptoms with anyone for six months until he began seeking help in June of 2018.  At that time he started Abilify and stated it was hlepful.  Since then he has seen two different psychiatrists who have suggested a differential diagnosis of either MDD with psychosis or schizophrenia.  Madhu states that Abilify caused him to feel \"flat\" and unable to " "experience emotions.  He was recently switched to Olanzapine.      During interview on the unit today, Madhu states that his depression has been worsening over the past month.  He has been sleeping more, about 10-14 hours per day. He has been missing some shifts at work, where he is a  at [employer].  He reports his auditory hallucinations are \"whispers in the background\".  They are worse when he is stressed out.  He reports is visual hallucinations are \"shadows\".  He feels the Olanzapine is better than the Abilify, however he still feels more sleepy than he thinks he should be.     He reports some passive suicidal ideation that comes and goes.  He denies having a specific plan.  He denies SIB and HI.  He reports he does not want to be in the hospital.      The treatment team talks at length with Madhu about ECT.  Madhu states he will have to think about it more.  This writer suggests that Madhu meet with Dr. Devine, who preforms the ECT treatments before he make his final decision. Madhu is agreeable to this.      This writer spoke with Madhu's parents, Carmen and Brian.  They are worried about Madhu's increased need for sleep and lack of interest in things he used to enjoy such as spending time with friends.  However they deny knowing of any acute safety concerns.  Madhu has not made any specific threats to his safety or to anyone else.       The risks, benefits, alternatives and side effects have been discussed and are understood by the patient and other caregivers.       Psychiatric Review of Systems:     Depression:   Reports: depressed mood, passive suicidal ideation, decreased interest, increased need for sleep,  impaired concentration, decreased energy, irritability.   Velma:   Denies: sleeplessness, impulsiveness (spending, driving recklessly, change in sexual activity),increased goal-directed activities, pressured speech, grandiose delusions.  Psychosis:   Reports: visual hallucinations, auditory " "hallucinations,   Anxiety:   Reports: worries, ruminating thoughts  PTSD:   Denies: re-experiencing past trauma, nightmares, increased arousal, avoidance of traumatic stimuli, impaired function.  OCD:   Reports:  Symmetry with numbers (even numbers, 5s and 10s)         Medical Review of Systems:   The Review of Systems is negative other than noted in the HPI         Psychiatric History:     Prior diagnoses: MDD with psychosis vs. Schizoprenia, VENKAT    Hospitalizations: December 2017 ([facility])    Suicide attempts: December 2017- Stabbed himself in thigh- reports no sever injury from stab wound    Self-injurious behavior: Denies    Violence: None     ECT/TMS: No prior history    Past medications:   - Abilify:  Good symptom control but made him feel \"flat\"  - Lexapro: Has been on for years, reports it is working well, no side effects  - Olanzapine: has been on for 1-2 weeks, no side effects         Substance Use History:     Nicotine: Denies    Alcohol: One drink per week      Cannabis: Denies    Others: Denies    Prior CD treatments: Denies         Past Medical History:   No past medical history on file.  No past surgical history on file.  No History of seizures or head trauma.       Allergies:      Allergies   Allergen Reactions     Erythromycin-Sulfisoxazole Other (See Comments)     Other reaction(s): Other (see comments)  \"spit out\"  \"spit out\"            Medications:     No current outpatient medications on file.           Social History:     Upbringing: Madhu was born without pregnancy or delivery complications.   Madhu's parent/guardian denies in utero substance exposure. Madhu did meet developmental milestones on time. Madhu did not receive interventions for developmental delays. Madhu did not require an IEP during school. Madhu's mother reported that he had night terrors and anger issues during 2nd grade.      Family/Relationships: Madhu has a supportive family, and both parents accompanied him to the clinic for " "the diagnostic assessment.  Madhu has an older sister, Steph, who lives with their parents.    Living Situation: Madhu lives with three roommates, in a Private Apartment in [place], [place].       Education: Madhu is not currently attending school;  He graduated high school in 2015 and completed some college.   During high-school, he was in mainstream classes and received average grades.  He described himself as a \"quiet student.\"  He also studied Computer Science in college for 1.5 years  before withdrawing.     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 Madhu's sister also has had a suicide attempt, and has been diagnosed with borderline personality disorder. Madhu's maternal side of the family has history of depression and bipolar disorder.        Labs:     CBC with diff, CMP, TSH with T4, Lipid panel, folate, B12 pending for Am draw          Psychiatric Examination:     /84   Pulse 103   Temp 98.1  F (36.7  C) (Oral)   Resp 17     Appearance:  awake, alert, adequately groomed, dressed in hospital scrubs and appeared as age stated, wearing eye-glasses  Attitude:  cooperative  Eye Contact:  fair, often looking down at hands  Mood:  \"okay\", depressed, anxious about being in the hospital  Affect:  appropriate and in normal range and mood congruent  Speech:  clear, coherent and volume is low  Psychomotor Behavior:  no evidence of tardive dyskinesia, dystonia, or tics  Thought Process:  logical, linear and goal oriented  Associations:  no loose associations  Thought Content:  passive suicidal ideation present and auditory and visual hallucinations present  Insight:  limited  Judgment:  limited  Oriented to:  time, person, and place  Attention Span and Concentration:  intact  Recent and " Remote Memory:  intact  Language:  english with appropriate syntax and vocabulary  Fund of Knowledge: appropriate  Muscle Strength and Tone: normal  Gait and Station: Normal         Physical Exam:     General: Awake and alert, NAD  HEENT: EOMI, no scleral icterus, no injection of conjunctivae, moist mucus membranes  Respiratory: Breathing comfortably   Extremities: No cyanosis, clubbing, or edema   Skin: No gross rash, no bruising  Neuro: CN II-XII intact, no focal deficits           Assessment   This patient is a 23 year old male with history of MDD with psychotic features vs schizophrenia, and VENKAT who presented as a direct admission from first episode psychosis clinic on 02/01/2019 in the context of worsening depression, anxiety, and passive suicidal ideation. The patient's last psychiatric hospitalization was in December of 2017 following a suicide attempt in which he stabbed himself in the thigh.  The patient is currently followed by Dr. Duong at [facility] and Dr. Rice at the [facility]. Family history is notable for MDD, Bipolar disorder, and borderline personality disorder. . The patient denies  drug abuse or self-injurious behaviors. The MSE is notable for a young caucasion male with depressed mood, congruent affect, auditory and visual hallucinations. The patient's reported symptoms of depression, passive suicidal ideation, auditory and visual hallucinations are consistent with historic diagnosis of MDD with psychotic features. His symptoms indicate a primary mood disorder and are less suggestive of a thought disorder.  PTA medications were continued at time of admission. Disposition pending clinical stabilization, medication optimization and development of an appropriate discharge plan.          Plan   Admit to Unit 22 with Attending Physician Dr. Trinity Quarles   Legal Status: Voluntary    Safety Assessment:      Pt has not required locked seclusion or restraints in the past 24 hours to  maintain safety, please refer to RN documentation for further details.    Precautions: Suicide, self-injury     Principal psychiatric diagnosis:   # Major Depressive Disorder, with psychotic features     Secondary psychiatric diagnoses:   # Generalized anxiety disorder     Medications:   Outpatient medications continued:   - Olanzapine 10mg at bedtime   - Lexapro 20mg daily   - Metformin 500mg daily     New medications initiated:   - Tylenol 650mg [id number] PRN for mild pain   - Hydroxyzine 25mg [id number] PRN for anxiety   - Olanzapine 10mg PO/IM [id number] PRN for agitation   - Trazodone 50mg at bedtime PRN for sleep    - Patient will be treated in therapeutic milieu with appropriate individual and group therapies.  - medications as above    Medical diagnoses:    None     Consult: None at this time    Disposition:  To be determined pending clinical stabilization, medication optimization, and appropriate disposition planning.     -------------------------------------------------------  This patient was seen and discussed with my attending physician, MD Dr. Ginger Hanley DO, MBA, MS  PGY-1 Psychiatry Resident Physician     --------------------------------------------------------------------------------  Attestation:  I, Trinity Quarles, have personally performed an examination of this patient and I have reviewed the resident's documentation.  I have edited the note to reflect all relevant changes.  I have discussed this patient with the house staff on 2/1/2019.  I agree with resident findings and plan in today's  H&P.  I have reviewed all vitals and laboratory findings.      I certifiy that the inpatient services were ordered in accordance with the Medicare regulations governing the order. This includes certification that hospital inpatient services are reasonable and necessary and in the case of services not specified as inpatient-only under 42 .22(n), that they are appropriately provided as inpatient  services in accordance with the 2-midnight benchmark under 42 .3(e).     The reason for inpatient status is Major Depression with psychotic features.    Trinity Quarles MD

## 2019-02-02 VITALS
TEMPERATURE: 97.7 F | RESPIRATION RATE: 17 BRPM | HEART RATE: 103 BPM | SYSTOLIC BLOOD PRESSURE: 141 MMHG | DIASTOLIC BLOOD PRESSURE: 84 MMHG

## 2019-02-02 NOTE — DISCHARGE SUMMARY
"    ----------------------------------------------------------------------------------------------------------  [facility]  Discharge Summary      Madhu Gibson MRN# [id number]   Age: 23 year old YOB: 1995     Date of Admission:  2/1/2019  Date of Discharge:  2/1/2019  5:30 PM  Admitting Physician:  Trinity Quarles MD  Discharge Physician:  Dr. Trinity Quarles MD         Event Leading to Hospitalization:   The following is taken from H&P by Dr. Trinity Quarles, dated 02/01/19:    \"Per chart review, Madhu has a chronic mental health history.  He was diagnosed with depression and anxiety around the age of 13 and began taking Lexapro at this time to manage his symtpoms.  His depressive episodes were well controlled until the age of [age] when he was working full-time at [employer] and was under stress due to staffing issues at work.  He was hsopitalized at [facility] in December of 2017 (age 22) after he attempted sucide by stabbing himself in the thigh.  Following this hospitalization, he began experiencing auditory and visual hallucinations.  He did not share these symptoms with anyone for six months until he began seeking help in June of 2018.  At that time he started Abilify and stated it was hlepful.  Since then he has seen two different psychiatrists who have suggested a differential diagnosis of either MDD with psychosis or schizophrenia.  Madhu states that Abilify caused him to feel \"flat\" and unable to experience emotions.  He was recently switched to Olanzapine.       During interview on the unit today, Madhu states that his depression has been worsening over the past month.  He has been sleeping more, about 10-14 hours per day. He has been missing some shifts at work, where he is a  at [employer].  He reports his auditory hallucinations are \"whispers in the background\".  They are worse when he is stressed out.  He reports is " "visual hallucinations are \"shadows\".  He feels the Olanzapine is better than the Abilify, however he still feels more sleepy than he thinks he should be.      He reports some passive suicidal ideation that comes and goes.  He denies having a specific plan.  He denies SIB and HI.  He reports he does not want to be in the hospital because he does not like hospitals and they make his anxiety worse.  He states he would like to go home and be with his parents.      The treatment team talks at length with Madhu about ECT.  Madhu states he will have to think about it more.  This writer suggests that Madhu meet with Dr. Devine, who preforms the ECT treatments before he make his final decision. Madhu is agreeable to this. \"           See Admission note by Trinity Quarles MD on 02/01/19 for additional details.          Diagnoses:     Principal psychiatric diagnosis:   # Major Depressive Disorder, with psychotic features      Secondary psychiatric diagnoses:   # Generalized anxiety disorder             Consults:     Dr. Devine was consulted to discuss ECT with patient (please see consult note for further information).          Hospital Course:     Madhu Gibson was admitted to [facility] with attending Trinity Quarles MD as a voluntary patient. The patient was placed under status 15 (15 minute checks) to ensure patient safety. On admission, outpatient medications were continued.  After Madhu arrived on the unit from the clinic, he was interviewed by the treatment team and in depth discussion was had about pursuing ECT, which had been the recommendation from Dr. Mendoza and Dr. Francisco in clinic.  Madhu remained unsure on whether he would like to move forward with ECT.  Dr. Devine was consulted to meet with Madhu and provide further information regarding ECT.  After meeting with Dr. Devine, Madhu chose to sign a 12-hour intent to leave the hospital.  Based on this writer's interview with Madhu, patient denied acute active SI, SIB, and HI " there was no acute safety concern meeting criteria to place patient on a hold.  This writer also spoke with Madhu's parents Carmen and Brian and Dr. Mendoza who confirmed there was no acute safety concern with discharging the patient.      Madhu Gibson was discharged to home. At the time of discharge Madhu Gibson was determined to not be a danger to himself or others.    Today Madhu Gibson denies active SI, SIB, HI. , Madhu Gibson has notable risk factors for self-harm, including anxiety and psychosis. However, risk is mitigated by commitment to family, sobriety, ability to volunteer a safety plan and history of seeking help when needed. Therefore, based on all available evidence including the factors cited above, Madhu Gibson does not appear to be at imminent risk for self-harm, and is appropriate for outpatient level of care.     This document serves as a transfer of care to Madhu Gibson's outpatient providers.         Discharge Medications:     - Lexapro 20g daily  - Olanzapine 10mg at bedtime   -Metformin 500mg daily          Psychiatric Examination:     Appearance:  awake, alert, adequately groomed, dressed in hospital scrubs and appeared as age stated  Attitude:  cooperative and guarded  Eye Contact:  fair, looking down at hands often  Mood:  sad  and anxious about being in hospital  Affect:  appropriate and in normal range and mood congruent  Speech:  clear, coherent and volume is soft  Psychomotor Behavior:  no evidence of tardive dyskinesia, dystonia, or tics  Thought Process:  logical and linear  Associations:  no loose associations  Thought Content:  passive suicidal ideation present, auditory hallucinations present and visual hallucinations present  Insight:  fair  Judgment:  fair  Oriented to:  time, person, and place  Attention Span and Concentration:  fair  Recent and Remote Memory:  fair  Language: Able to name objects, Able to repeat phrases and Able to read and write  Fund of  Knowledge: appropriate  Muscle Strength and Tone: normal  Gait and Station: Normal         Discharge Plan:     19: Psychiatry appointment with Dr. Edgard Mendoza    This patient was seen and discussed with my attending physician.  Dr. Ginger Quezada DO, PEPE, MS  PGY-1 Psychiatry Resident Physician         Attestation:    Although the presentation/diagnosis at the time of admission led to an expectation that hospitalization would span >2 midnights, discharge is reasonable at this time given the following factors: The patient was not an acute danger to self or others and signed a 12 hour intent to leave.  Thus he was discharged into the care of his parents and returned home.      The patient has been seen and evaluated by me,  Trinity Quarles. I have examined the patient today and reviewed the discharge plan with the resident and medical student. I agree with the final assessment and plan, as noted in the discharge summary. I have reviewed today's vital signs, medications, labs and imaging.  Total time discharge plannin minutes  Trinity Quarles MD

## 2019-02-04 NOTE — TELEPHONE ENCOUNTER
Huber Mendoza MD Pratt, Laura, RN Cc: [place], [facility]; Huber Mendoza MD             We should still make sure ELSA comes back on 2/8 for their findings visit, despite him not being hospitalized.      Writer called pt's mother. No answer. LVM informing her that pt should come to Friday's appt. Requested a c/b if she has any additional questions.

## 2019-02-04 NOTE — TELEPHONE ENCOUNTER
Ronnie Rowley Laura, RN             Got a return call from a Carmen (Lisbet?) that said she received your voicemail and wanted you and Dr. SHEPPARD to know that she will be there on Friday the 8th regarding attached patient's upcoming appointment.      No further action needed by writer.

## 2019-02-08 ENCOUNTER — OFFICE VISIT (OUTPATIENT)
Dept: PSYCHIATRY | Facility: CLINIC | Age: 24
End: 2019-02-08
Attending: PSYCHIATRY & NEUROLOGY
Payer: COMMERCIAL

## 2019-02-08 ENCOUNTER — OFFICE VISIT (OUTPATIENT)
Dept: PSYCHIATRY | Facility: CLINIC | Age: 24
End: 2019-02-08
Attending: PSYCHOLOGIST
Payer: COMMERCIAL

## 2019-02-08 ENCOUNTER — TELEPHONE (OUTPATIENT)
Dept: PSYCHIATRY | Facility: CLINIC | Age: 24
End: 2019-02-08

## 2019-02-08 VITALS — WEIGHT: 261.6 LBS | SYSTOLIC BLOOD PRESSURE: 116 MMHG | HEART RATE: 83 BPM | DIASTOLIC BLOOD PRESSURE: 77 MMHG

## 2019-02-08 DIAGNOSIS — F32.3 MAJOR DEPRESSIVE DISORDER WITH PSYCHOTIC FEATURES (H): ICD-10-CM

## 2019-02-08 DIAGNOSIS — F32.3 MAJOR DEPRESSIVE DISORDER WITH PSYCHOTIC FEATURES (H): Primary | ICD-10-CM

## 2019-02-08 DIAGNOSIS — F33.3 SEVERE RECURRENT MAJOR DEPRESSIVE DISORDER WITH PSYCHOTIC FEATURES (H): Primary | ICD-10-CM

## 2019-02-08 PROCEDURE — 93005 ELECTROCARDIOGRAM TRACING: CPT | Mod: ZF | Performed by: STUDENT IN AN ORGANIZED HEALTH CARE EDUCATION/TRAINING PROGRAM

## 2019-02-08 PROCEDURE — G0463 HOSPITAL OUTPT CLINIC VISIT: HCPCS | Mod: ZF

## 2019-02-08 PROCEDURE — 93010 ELECTROCARDIOGRAM REPORT: CPT | Performed by: INTERNAL MEDICINE

## 2019-02-08 RX ORDER — VENLAFAXINE HYDROCHLORIDE 37.5 MG/1
37.5 CAPSULE, EXTENDED RELEASE ORAL DAILY
Qty: 60 CAPSULE | Refills: 0 | Status: SHIPPED | OUTPATIENT
Start: 2019-02-08 | End: 2019-02-22

## 2019-02-08 RX ORDER — OLANZAPINE 7.5 MG/1
7.5 TABLET, FILM COATED ORAL AT BEDTIME
Qty: 30 TABLET | Refills: 1 | Status: SHIPPED | OUTPATIENT
Start: 2019-02-08 | End: 2019-02-22

## 2019-02-08 ASSESSMENT — PAIN SCALES - GENERAL: PAINLEVEL: NO PAIN (0)

## 2019-02-08 NOTE — NURSING NOTE
Chief Complaint   Patient presents with     RECHECK     Psychosis, unspecified psychosis type

## 2019-02-08 NOTE — PROGRESS NOTES
FIRST EPISODE PSYCHOSIS PROGRAM  FINDINGS VISIT  [facility] PSYCHIATRY CLINIC         CARE TEAM:  PCP- Dontrell Baltazar    Specialty Providers- CRISTY LopezBAtilioSAtilio ([facility])    Therapist- yes, private practice in [facility] Team- no.    Madhu Gibson is a 23 year old male who prefers the name Madhu & pronouns he, him.    Referred by: Dr. Duong  Referred for evaluation of:  Psychosis, depression  History Provided by:  patient and parents who were good historians    Parents Fredy     Pertinent Background: This patient first experienced symptoms of depression around the age 10 or 11 and has received treatment since that time. Limited med exposure. Developed symptoms of psychosis and had suicide attempt in December 2017 and remained depressed/psychotic for approximately 1 year before referral to first episode psychosis program here at the [facility].    Psych critical item history includes suicide attempt [single], suicidal ideation, psychosis [sxs include AH, VH, possible paranoia] and psych hosp (<3).     INTERIM HISTORY                                                                                                                 4, 4   The patient reports good treatment adherence.  History was provided by the patient and family who were good historians.  The last visit ended with the following med change: no med change, but admitted to hospital for ECT.     Joined by pt, his parents, Dr. Gardner, ABEL Veloz, Psychology intern Milvia Horta, and DNP trainee Federico    Since the last visit:     - Meeting today for family/finding visit to review diagnostic assessments/findings/results  - Following last appt, pt was admitted to hospital for ECT, but then elected to discharge with hopes to try a new medication combo instead of ECT for the time being  - Patient continues to feel low, sad, fatigue. Sleeping 16+ hours daily.  - AH/VH are not as troublesome lately  - Primary goal is to get help with  anhedonia/amotivation. Patient reports he is able to experience pleasure being with friends and going out for dinner with his parents.  - Unclear what form of psychotherapy he is getting  - Ruminates a lot about (a) simply being depressed all the time, and (b) money    RECENT SYMPTOMS:   DEPRESSION:  reports-suicidal ideation, depressed mood, anhedonia, low energy, hypersomnia, poor concentration /memory, feeling worthless, feeling hopeless and feeling trapped;  DENIES- excessive crying  WALT/HYPOMANIA:  reports-none;  DENIES- increased energy, decreased sleep need and increased activity  PSYCHOSIS:  reports-auditory hallucinations, visual hallucinations and negative symptoms (avolition, affective flattening, anhedonia, alogia, apathy, social withdrawal);  DENIES- disorganized behavior  DYSREGULATION:  reports-none;  DENIES- SIB, mood dysregulation, aggressive and physically agitated  ANXIETY:  excessive worry  SLEEP:  As above     RECENT SUBSTANCE USE:     ALCOHOL- Drinks about 1 beer every month      TOBACCO- no     CAFFEINE- Not discussed at this time  OPIOIDS- no         NARCAN KIT- N/A        CANNABIS- no            OTHER ILLICIT DRUGS- none      CURRENT SOCIAL HISTORY:  FINANCIAL SUPPORT- Currently working part-time at a [employer] though would be working full-time if not for symptoms of mental illness       CHILDREN- no      LIVING SITUATION- lives with 3 friends in apt near [facility]      SOCIAL/ SPIRITUAL SUPPORT- parents, friends     FEELS SAFE AT HOME- no, persistent SI     MEDICAL ROS (2,10):  Reports A comprehensive review of systems was performed and is negative other than noted in the HPI.      Denies A comprehensive review of systems was performed and is negative other than noted in the HPI.    PSYCH and CD Critical Summary Points since July 2018 January 2019 -- intake; hospitalized briefly for ECT but decided against  February 2019 -- initiated VLFX with plan to discontinue  escitalopram at future date; continue olanzapine    PAST PSYCH MED TRIALS     Currently olanzapine and escitalpram  H/o aripiprazole    MEDICAL / SURGICAL HISTORY                                   Neurologic Hx- none reported   Patient Active Problem List   Diagnosis     Psychosis, unspecified psychosis type (H)     Suicidal ideation     Major depressive disorder with psychotic features (H)       No past surgical history on file.     ALLERGY                                Erythromycin-sulfisoxazole  MEDICATIONS                               Current Outpatient Medications   Medication Sig Dispense Refill     cholecalciferol (VITAMIN D3) 5000 units (125 mcg) capsule Take 5,000 Units by mouth daily       escitalopram (LEXAPRO) 20 MG tablet Take 1.5 tablets (30 mg) by mouth At Bedtime 45 tablet 0     loratadine (CLARITIN) 10 MG tablet Take 1 tablet (10 mg) by mouth as needed 30 tablet 0     metFORMIN (GLUCOPHAGE) 500 MG tablet Take 1 tablet (500 mg) by mouth 2 times daily (with meals) 60 tablet 0     OLANZapine (ZYPREXA) 10 MG tablet Take 1 tablet (10 mg) by mouth At Bedtime 30 tablet 0     venlafaxine (EFFEXOR XR) 37.5 MG 24 hr capsule Take 1 capsule (37.5 mg) by mouth daily for 1 week then increase to 2 capsules (75mg) 60 capsule 0     VITALS                                                                                                                            3, 3   /77   Pulse 83   Wt 118.7 kg (261 lb 9.6 oz)    MENTAL STATUS EXAM                                                                                    9, 14 cog gs     Alertness: alert  and oriented  Appearance: casually groomed  Behavior/Demeanor: passive, with fair  eye contact   Speech: slowed  Language: intact  Psychomotor: slowed  Mood: depressed and anxious  Affect: flat; was congruent to mood; was congruent to content  Thought Process/Associations: linear, organized  Thought Content:  Reports suicidal ideation;  Denies preoccupations,  "obsessions , magical thinking and paranoid ideation  Perception:  Reports auditory hallucinations and visual hallucinations;  Denies depersonalization and derealization  Insight: adequate  Judgment: adequate for safety  Cognition: (6) does  appear grossly intact; formal cognitive testing was not done  Gait and Station: unremarkable    LABS and DATA     AIMS:  not done at this time    PHQ9 TODAY = not completed    No flowsheet data found.     EKG QTc = 429 on 02/08/19    PSYCHIATRIC DIAGNOSES                                                                                               Major Depressive Disorder, Recurrent, Severe; with psychotic features  R/o Schizophrenia Spectrum Disorder such as Schizoaffective, Depressed Type     ASSESSMENT                                                                                                          m2, h3     Madhu Gibson is a 23-year-old  male with a history of childhood onset depressive symptoms who presents for evaluation of psychotic symptoms that emerged following a suicide attempt approximately 13 months ago. Per original evaluation:    He was referred by his primary outpatient psychiatrist in [place] for second opinion/consultation.  Patient has a long history of depressive symptoms including anergia, anhedonia, low mood, and sleep disturbances, along with anxious rumination.  The symptoms evolved to include psychotic symptoms that have lingered despite treatment with combination of antidepressant and antipsychotic meds.  He apparently had limited to no response with aripiprazole and has only had a very small improvements, if any, with recent change to olanzapine. He did notice a modest improvement when on lower dose of olanzapine and aripiprazole (amidst cross-titration). He is also describing suicidal thoughts, sometimes active with a plan to stab himself, because he is \"tired of feeling empty all the time.\"  His family history is noted for several mood " disorders including borderline, depression, and bipolar disorder. He does not have any medical comorbidities. On exam, he presents as very flat, withdrawn, and depressed. His cognition, mood is slowed, is linear, organized, and goal-directed.    Today we reviewed or diagnostic findings. Discussed that ECT continues to be high on list of interventions, as he would likely respond well, but for now can look to medication adjustments. Will aim to transition to VLFX with plan to discontinue escitalopram. Re: AP, will lower OLZ dose today because of sedation and can consider future cross to quetiapine. Given his reported improvement at lower doses of OLZ/Abilify, could paradoxically respond better to low doses. Discussed lowering esctilapram today but has had precipitous mood drops when missed doses of escitalopram, so will elected to get EKG today and plan to lower at next appt.    Cognitive testing results consistent with average to moderately above average cognitive functioning with some impairments in processing speed. See Milvia Horta's note for add'l details.    Also discussed recs for CBT for Depression. Will have our SW contact his therapist about this.    I left a message for Dr. Duong at [facility]. I learned from his nurse that CBT-Depression is available within their clinic if needed.    [place] PRESCRIPTION MONITORING PROGRAM [] was checked today:  not using controlled substances.    PSYCHOTROPIC DRUG INTERACTIONS:   Concurrent use of ESCITALOPRAM and QT INTERVAL-PROLONGING DRUGS may result in increased risk of QT-interval prolongation. .  MANAGEMENT:  periodic EKGs     PLAN                                                                                                                        m2, h3     1) PSYCHOTROPIC MEDICATIONS:  - Lower olanzapine to 7.5mg at bedtime  - Continue escitalopram 30mg  - Start venlafaxine ER 37.5mg daily x 7 days the increase to 75mg    2) THERAPY:    Can continue with individual  psychotherapist for now but will confirm is CBT-Depression, otherwise can have him see someone within [facility].    3) NEXT DUE:    Labs- needs antipsychotic labs  EKG- QTc = 429 on 2/8/19  Rating Scales- needs AIMS    4) REFERRALS:    None for now    5) RTC: 2-3 weeks    6) CRISIS NUMBERS:   Provided routinely in AVS.    ONLY if a [facility] PT: [facility] [phone] (clinic), [phone] (after hours)     TREATMENT RISK STATEMENT:  The risks, benefits, alternatives and potential adverse effects have been discussed and   are understood by the pt. The pt understands the risks of using street drugs or alcohol. There are no medical contraindications, the pt agrees to treatment with the ability to do so. The pt knows to call the clinic for any problems or to access emergency care if needed.  Medical and substance use concerns are documented above.  Psychotropic drug interaction check was done, including changes made today.    PROVIDER: Huber Mendoza MD    Patient staffed in clinic with Dr. Gardner who will sign the note.    I saw the patient with the resident, and participated in key portions of the service, including the mental status examination and developing the plan of care. I reviewed key portions of the history with the resident. I agree with the findings and plan as documented in this note.    Bijal Gardner

## 2019-02-08 NOTE — TELEPHONE ENCOUNTER
Social Work  Outgoing Voicemail Message  [facility]      Left a detailed voicemail message for Madhu's therapist, Vincent Burgos ([phone]) introducing self and asking for call back to collaborate on treatment recommendations.  Writer asked Vincent to clarify if he is specialized in CBT-Depression, or if it would be best to refer Madhu to a therapist within his psychiatry clinic. Writer requested call back. Included writer's direct contact information and availability.             HIPOLITO Payan learner

## 2019-02-11 ENCOUNTER — TELEPHONE (OUTPATIENT)
Dept: PSYCHIATRY | Facility: CLINIC | Age: 24
End: 2019-02-11

## 2019-02-11 LAB — INTERPRETATION ECG - MUSE: NORMAL

## 2019-02-11 NOTE — TELEPHONE ENCOUNTER
Huber Mendoza MD Pratt, Laura, RN   Caller: Unspecified (Today, 10:52 AM)             I double-checked my note. Yes, those meds are correct as written. Thanks for taking care of this.     Edgard

## 2019-02-11 NOTE — LETTER
February 11, 2019      TO: Madhu Gibson  [address] [place] [address]         Dear Mr. Madhu Gibson,    Please take the following medications:    1. olanzapine (Zyprexa) 7.5 mg tablet: take 1 tab (7.5 mg) by mouth at bedtime    2. escitalopram (Lexapro) 20 mg tablet: take 1.5 tabs (30 mg) by mouth at bedtime    3. venlafaxine (Effexor XR) 37.5 mg capsules: take 1 cap (37.5 mg) by mouth daily for 1 week. Then increase to 2 caps (75 mg) by mouth daily    4. metformin (Glucophage) 500 mg: take 1 tab (500 mg) by mouth two times daily with meals      Sincerely,    Huber Mendoza MD

## 2019-02-11 NOTE — TELEPHONE ENCOUNTER
"Ronnie Rowley Laura RN             Patient mother called today (3:30pm) with the following message for Dr. Mendoza     \"We feel like Madhu is having trouble with his memory lately, and with the change in medications, we are very concerned that Madhu will not remember to take his new medication. Could you please get this medication change in writing so that we can post it somewhere and help Madhu remember to take his meds?\"     [email or web address] is the best email to reach Madhu at otherwise patient mother said we have his number on file (jenny SHEPPARD called him recently)        "

## 2019-02-11 NOTE — TELEPHONE ENCOUNTER
Writer drafted letter with new med orders from pt's appt on 2/8/19. Uncertain if office visit note is accurate as it's unsigned. Will route encounter to provider to confirm.

## 2019-02-12 ENCOUNTER — TELEPHONE (OUTPATIENT)
Dept: PSYCHIATRY | Facility: CLINIC | Age: 24
End: 2019-02-12

## 2019-02-12 NOTE — PROGRESS NOTES
First Episode Psychosis   Strengths Program  Explanation of Findings Visit  [facility]    Patient Name:  Madhu Gibson  /Age:  1995 (23 year old)  Initial Diagnosis(es):  F32.3 Major Depression with Psychotic features, mood-congruent  Patient Active Problem List    Diagnosis Date Noted     Suicidal ideation 2019     Priority: Medium     Major depressive disorder with psychotic features (H) 2019     Priority: Medium     Psychosis, unspecified psychosis type (H) 2019     Priority: Medium     Diagnostic Assessment Completed: 2019; Please see in chart review for details  Cognitive Testing Completed: 2019; Please see in chart review for details    Date of Findings Meetin19  Length of Actual Contact: 60 minutes  Start time: 9:00AM  End time: 10:00AM    Type of contact:   Explanation of Findings    Individuals Present:    Client: Yes  Parents: Caitlin and Brian Gibson  Prescriber(s): Huber Mendoza MD; SHRUTHI Anderson student; Bijal Gardner MD  Psychologist: Lisa Horta, Juvenal learner   & Supportive Education and Employment (SEE) specialist: HIPOLITO Payan learner    Total number of people who participated in contact: 8, including clinical team    Location of contact:   [facility]   [facility] [address] [address]  Interventions:  >Langdon announced at beginning of session  >Review of each team member's role   >Review of diagnosis, symptoms to substantiate the diagnosis, and affected level of functioning  >Interpretation and explanation of results of psychological testing  >Review of medications  >Review of goals and associated strengths, barriers, objectives, and interventions  >Review of safety risks  (ie SI and HI) and characteristics and interventions to mitigate risk  >Advice given as to how interpersonal supports can best assist the patient's recovery  >Explored aspects of  family history/dynamics  >Explored pt/family understanding of, and adjustment to psychosis / illness  >Review of frequency of appointments and anticipated length of treatment  >Illicit client/family feedback    Assessment:  The above named individuals met for the purposes of reviewing the findings of the diagnostic assessment, Psychological & Cognitive testing, and Psychiatric consultation recently completed.     Per Psychiatric consultation: Madhu Gibson is a 23 year old year old male. Today, The patient endorses the following side effects:  weight gain, excessive sleep.  Informed Madhu of diagnostic impressions corresponding with diagnoses of Major Depression, psychotic features, r/o schizophrenia.     Per cognitive testing:   Overall, Mr. Gibson s performance on the WAIS-IV fell in the Average range of ability as compared to his same-aged peers.  Mr. Gibson s general cognitive profile suggests intact cognitive functioning, well within the normal limits for his age.    Psychosocial considerations: The team encouraged Madhu to engage with his therapist in a CBT-Depression modality as soon as possible, in conjunction with his medication management.  FEP team will coordinate with Vincent Burgos in regards to this recommendation. 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     Recommendations:  Informed Madhu that he does seem appropriate for First Episode of Psychosis, Strengths Program. However, due to Madhu's residency in [place], he is not interested in working within the clinic for FEP services.  He was agreeable to working with Dr. Mendoza on a short-term basis to focus on medication management.  Ultimately, his long-term plan is to transfer service to local providers in [place].  Discussed a recommendation of integrating Cognitive Behavioral Therapy (CBT) for Depression with his therapy provider.  The team will  connect with his existing therapist, Vincent Burgos.     For Madhu, it is recommended that he begin medication management within the Strengths Program to assist in their recovery.  Follow-up appointments outlined below.  Additional community support recommendations include scheduling follow-up therapy appointment with Vincent Burgos.  Considerations to improve cognitive functioning discussed.  They were not interested in research opportunities available through the [facility].      Plan:  Madhu was agreeable to beginning the below mentioned services.  Follow-up appointments have been arranged for the appropriate providers to initiate services.    1) Dr. Mendoza, 2/22/19 9:00AM  Medication Management    2) Dr. Vincent Burgos  Madhu will schedule independently        Treatment Risk Statement:  The patient understands the risks, benefits, adverse effects and alternatives. Agrees to treatment with the capacity to do so. No medical contraindications to treatment. Agrees to call clinic for any problems. The patient understands to call 911 or go to the nearest ED if life threatening or urgent symptoms occur.        Please call or EPIC message for questions or concerns related to the above information.           Attestation:  I have reviewed this note, but did not meet with the patient directly. This patient is discussed with me in individual social work supervision. I agree with the plan as documented.  Eden Green, MSW, LGSW,  February 20, 2019    I did not see this pt directly. This pt was discussed with me in individual clinical social work supervision, and I agree with the plan as documented.    Veronica Wolfe, MSW, LICSW February 20, 2019

## 2019-02-12 NOTE — TELEPHONE ENCOUNTER
First Episode Psychosis Program    Incoming/Outgoing Call  [facility]      Outgoing call to: Vincent Burgos [facility] therapist; Madhu Gibson    Reason for Call:  Coordination of Care    Response/Plan:  Writer received call back from [facility] Vincent Burgos-he indicated that he is trained in CBT-Depression, and would be able to schedule Madhu within the next week to focus on this modality if he is interested.  Writer called Madhu to discuss-gave the option of working with Vincent, or referring to a new therapist at his local psychiatry clinic.  Madhu said that he would prefer to plan on contacting Vincent today to start back up with him. Writer left follow up voicemail to Vincent, to let him know Madhu would be calling to set up the appointment.  Will follow up with Madhu on Friday to ensure that it was scheduled.      Will route to patient's current psychiatric provider(s) as an FYI.   Please call or EPIC message with any questions or concerns.    HIPOLITO Payan candidate

## 2019-02-19 ENCOUNTER — TELEPHONE (OUTPATIENT)
Dept: PSYCHIATRY | Facility: CLINIC | Age: 24
End: 2019-02-19

## 2019-02-19 NOTE — TELEPHONE ENCOUNTER
First Episode Psychosis Program    Incoming/Outgoing Call  [facility]      Outgoing call to: Madhu Gibson     Reason for Call:  Coordination of Care    Response/Plan:  Writer called Madhu to check in on his therapy appointment scheduling.  Madhu indicated that he still has not scheduled an appointment with Vincent Burgos, but that he will do so ASAP.  He indicated he would check in with the FEP team on progress of scheduling at his appointment at the clinic on Friday.       Will route to patient's current psychiatric provider(s) as an FYI.   Please call or EPIC message with any questions or concerns.    HIPOLITO Payan learner  [phone]

## 2019-02-22 ENCOUNTER — TELEPHONE (OUTPATIENT)
Dept: PSYCHIATRY | Facility: CLINIC | Age: 24
End: 2019-02-22

## 2019-02-22 ENCOUNTER — OFFICE VISIT (OUTPATIENT)
Dept: PSYCHIATRY | Facility: CLINIC | Age: 24
End: 2019-02-22
Attending: PSYCHIATRY & NEUROLOGY
Payer: COMMERCIAL

## 2019-02-22 VITALS — HEART RATE: 102 BPM | SYSTOLIC BLOOD PRESSURE: 137 MMHG | WEIGHT: 259.4 LBS | DIASTOLIC BLOOD PRESSURE: 83 MMHG

## 2019-02-22 DIAGNOSIS — F33.3 SEVERE RECURRENT MAJOR DEPRESSIVE DISORDER WITH PSYCHOTIC FEATURES (H): ICD-10-CM

## 2019-02-22 DIAGNOSIS — F32.3 MAJOR DEPRESSIVE DISORDER WITH PSYCHOTIC FEATURES (H): ICD-10-CM

## 2019-02-22 PROCEDURE — G0463 HOSPITAL OUTPT CLINIC VISIT: HCPCS | Mod: ZF

## 2019-02-22 RX ORDER — ESCITALOPRAM OXALATE 20 MG/1
20 TABLET ORAL AT BEDTIME
Qty: 45 TABLET | Refills: 0 | Status: SHIPPED | OUTPATIENT
Start: 2019-02-22 | End: 2019-04-05

## 2019-02-22 RX ORDER — VENLAFAXINE HYDROCHLORIDE 75 MG/1
CAPSULE, EXTENDED RELEASE ORAL
Qty: 60 CAPSULE | Refills: 1 | Status: SHIPPED | OUTPATIENT
Start: 2019-02-22 | End: 2019-04-05

## 2019-02-22 RX ORDER — OLANZAPINE 5 MG/1
5 TABLET ORAL AT BEDTIME
Qty: 30 TABLET | Refills: 0 | Status: SHIPPED | OUTPATIENT
Start: 2019-02-22 | End: 2019-03-22

## 2019-02-22 ASSESSMENT — PAIN SCALES - GENERAL: PAINLEVEL: NO PAIN (0)

## 2019-02-22 NOTE — PATIENT INSTRUCTIONS
- Increase venlafaxine to 150mg daily (in the morning). If tolerating after 1 week, then increase again to 225mg.  - Lower escitalopram to 20mg daily.  - Lower olanzapine to 5mg at bedtime.  - You are going to call to schedule therapy today.    Let's meet again in 3 weeks.

## 2019-02-22 NOTE — TELEPHONE ENCOUNTER
On 1/8/2019 the patient signed an CHIDI authorizing medical records to be released from [facility] to Vincent Rios for the purpose of continuing care. I sent the request to medical records via the tube system and kept a copy in psychiatry until scanning is complete.  Jenae Luis [facility]

## 2019-02-22 NOTE — PROGRESS NOTES
FIRST EPISODE PSYCHOSIS PROGRAM  [facility]ATRY [facility]INIC    FOLLOW-UP VISIT         CARE TEAM:  PCP- Dontrell Baltazar    Specialty Providers- CRISTY LopezBAtilioSAtilio ([facility])    Therapist- yes, private practice in [facility] Team- no.    Madhu Gibson is a 23 year old male who prefers the name Madhu & pronouns he, him.    Referred by: Dr. Duong  Referred for evaluation of:  Psychosis, depression  History Provided by:  patient and parents who were good historians    Parents Fredy     Pertinent Background: This patient first experienced symptoms of depression around the age 10 or 11 and has received treatment since that time. Limited med exposure. Developed symptoms of psychosis and had suicide attempt in December 2017 and remained depressed/psychotic for approximately 1 year before referral to first episode psychosis program here at the [facility].    Psych critical item history includes suicide attempt [single], suicidal ideation, psychosis [sxs include AH, VH, possible paranoia] and psych hosp (<3).     INTERIM HISTORY                                                                                                                 4, 4   The patient reports good treatment adherence.  History was provided by the patient and family who were good historians.  The last visit ended with the following med change: started VLFX, reduced OLZ.     Since the last visit:     Continues to feel depressed and is now reporting anhedonia for video games. Not playing much games at all. Hasn't been able to muster effort to call for psychotherapy. Parents are worried he's not doing anything. Oversleeping (12 hours or so). Hasn't noticed any changes with med adjustments at last appt. No SEs.    RECENT SYMPTOMS:   DEPRESSION:  reports-suicidal ideation, depressed mood, anhedonia, low energy, hypersomnia, poor concentration /memory, feeling worthless, feeling hopeless and feeling trapped;  DENIES- excessive  crying  WALT/HYPOMANIA:  reports-none;  DENIES- increased energy, decreased sleep need and increased activity  PSYCHOSIS:  reports-auditory hallucinations, visual hallucinations and negative symptoms (avolition, affective flattening, anhedonia, alogia, apathy, social withdrawal);  DENIES- disorganized behavior  DYSREGULATION:  reports-none;  DENIES- SIB, mood dysregulation, aggressive and physically agitated  ANXIETY:  excessive worry  SLEEP:  As above     RECENT SUBSTANCE USE:     ALCOHOL- Drinks about 1 beer every month      TOBACCO- no     CAFFEINE- Not discussed at this time  OPIOIDS- no         NARCAN KIT- N/A        CANNABIS- no            OTHER ILLICIT DRUGS- none      CURRENT SOCIAL HISTORY:  FINANCIAL SUPPORT- Currently working part-time at a [employer] though would be working full-time if not for symptoms of mental illness       CHILDREN- no      LIVING SITUATION- lives with 3 friends in apt near [facility] [address]      SOCIAL/ SPIRITUAL SUPPORT- parents, friends     FEELS SAFE AT HOME- no, persistent SI     MEDICAL ROS (2,10):  Reports A comprehensive review of systems was performed and is negative other than noted in the HPI.      Denies A comprehensive review of systems was performed and is negative other than noted in the HPI.    PSYCH and CD Critical Summary Points since July 2018 January 2019 -- intake; hospitalized briefly for ECT but decided against  February 2019 -- initiated VLFX with plan to discontinue escitalopram at future date; continue olanzapine    -- Increased VLFX and reduced escitalopram and olanzapine    PAST PSYCH MED TRIALS     Currently olanzapine and escitalpram  H/o aripiprazole    MEDICAL / SURGICAL HISTORY                                   Neurologic Hx- none reported   Patient Active Problem List   Diagnosis     Psychosis, unspecified psychosis type (H)     Suicidal ideation     Major depressive disorder with psychotic features (H)       No past surgical history on  file.     ALLERGY                                Erythromycin-sulfisoxazole  MEDICATIONS                               Current Outpatient Medications   Medication Sig Dispense Refill     cholecalciferol (VITAMIN D3) 5000 units (125 mcg) capsule Take 5,000 Units by mouth daily       escitalopram (LEXAPRO) 20 MG tablet Take 1.5 tablets (30 mg) by mouth At Bedtime 45 tablet 0     loratadine (CLARITIN) 10 MG tablet Take 1 tablet (10 mg) by mouth as needed 30 tablet 0     metFORMIN (GLUCOPHAGE) 500 MG tablet Take 1 tablet (500 mg) by mouth 2 times daily (with meals) 60 tablet 0     OLANZapine (ZYPREXA) 7.5 MG tablet Take 1 tablet (7.5 mg) by mouth At Bedtime 30 tablet 1     venlafaxine (EFFEXOR XR) 37.5 MG 24 hr capsule Take 1 capsule (37.5 mg) by mouth daily for 1 week then increase to 2 capsules (75mg) 60 capsule 0     VITALS                                                                                                                            3, 3   /83   Pulse 102   Wt 117.7 kg (259 lb 6.4 oz)    MENTAL STATUS EXAM                                                                                    9, 14 cog gs     Alertness: alert  and oriented  Appearance: casually groomed  Behavior/Demeanor: passive, with fair  eye contact   Speech: slowed  Language: intact  Psychomotor: slowed  Mood: depressed and anxious  Affect: flat; was congruent to mood; was congruent to content  Thought Process/Associations: linear, organized  Thought Content:  Reports suicidal ideation;  Denies preoccupations, obsessions , magical thinking and paranoid ideation  Perception:  Reports auditory hallucinations and visual hallucinations;  Denies depersonalization and derealization  Insight: adequate  Judgment: adequate for safety  Cognition: (6) does  appear grossly intact; formal cognitive testing was not done  Gait and Station: unremarkable    LABS and DATA     AIMS:  not done at this time    PHQ9 TODAY = not completed    No  "flowsheet data found.     EKG QTc = 429 on 02/08/19    PSYCHIATRIC DIAGNOSES                                                                                               Major Depressive Disorder, Recurrent, Severe; with psychotic features  R/o Schizophrenia Spectrum Disorder such as Schizoaffective, Depressed Type     ASSESSMENT                                                                                                          m2, h3     Madhu Gibson is a 23-year-old  male with a history of childhood onset depressive symptoms who presents for evaluation of psychotic symptoms that emerged following a suicide attempt approximately 13 months ago. Per original evaluation:    He was referred by his primary outpatient psychiatrist in [place] for second opinion/consultation.  Patient has a long history of depressive symptoms including anergia, anhedonia, low mood, and sleep disturbances, along with anxious rumination.  The symptoms evolved to include psychotic symptoms that have lingered despite treatment with combination of antidepressant and antipsychotic meds.  He apparently had limited to no response with aripiprazole and has only had a very small improvements, if any, with recent change to olanzapine. He did notice a modest improvement when on lower dose of olanzapine and aripiprazole (amidst cross-titration). He is also describing suicidal thoughts, sometimes active with a plan to stab himself, because he is \"tired of feeling empty all the time.\"  His family history is noted for several mood disorders including borderline, depression, and bipolar disorder. He does not have any medical comorbidities. On exam, he presents as very flat, withdrawn, and depressed. His cognition, mood is slowed, is linear, organized, and goal-directed.    Continues to report symptoms of severe depression. Psychotic symptoms are less prevalent, but still having AH Q2-3Days and VH a few times a month (shadow).    Will continue " to titrate VLFX and aim to discontinue escitalopram. Will continue him on some form of AP, whether OLZ or something less sedating, such as aripiprazole.    Discussed again that he would be a good candidate for ECT should medication changes not suffice.    [place] PRESCRIPTION MONITORING PROGRAM [] was checked today:  not using controlled substances.    PSYCHOTROPIC DRUG INTERACTIONS:   Concurrent use of ESCITALOPRAM and QT INTERVAL-PROLONGING DRUGS may result in increased risk of QT-interval prolongation. .  MANAGEMENT:  periodic EKGs     PLAN                                                                                                                        m2, h3     1) PSYCHOTROPIC MEDICATIONS:  - Decrease olanzapine to 5mg at bedtime  - Decrease escitalopram to 20mg  - Increase venlafaxine XL to 150mg and then again to 225mg in 1 week if tolerating    2) THERAPY:    Can continue with individual psychotherapist for now but will confirm is CBT-Depression, otherwise can have him see someone within [facility].    3) NEXT DUE:    Labs- needs antipsychotic labs  EKG- QTc = 429 on 2/8/19  Rating Scales- needs AIMS    4) REFERRALS:    None for now    5) RTC: 2-3 weeks    6) CRISIS NUMBERS:   Provided routinely in AVS.    ONLY if a ABDOUL PT: [facility] [phone] (clinic), [phone] (after hours)     TREATMENT RISK STATEMENT:  The risks, benefits, alternatives and potential adverse effects have been discussed and   are understood by the pt. The pt understands the risks of using street drugs or alcohol. There are no medical contraindications, the pt agrees to treatment with the ability to do so. The pt knows to call the clinic for any problems or to access emergency care if needed.  Medical and substance use concerns are documented above.  Psychotropic drug interaction check was done, including changes made today.    PROVIDER: Huber Mendoza MD    Patient was not staffed today. Will route note to   Kendra to review/sign.    I did not see this pt directly. I have reviewed the documentation, and I agree with the resident's plan of care.    Bijal Gardner

## 2019-02-22 NOTE — NURSING NOTE
Chief Complaint   Patient presents with     Recheck Medication     Severe recurrent major depressive disorder with psychotic features

## 2019-02-27 ENCOUNTER — TELEPHONE (OUTPATIENT)
Dept: PSYCHIATRY | Facility: CLINIC | Age: 24
End: 2019-02-27

## 2019-02-27 NOTE — TELEPHONE ENCOUNTER
"Writer called pt's mother, Carmen to discuss her concerns further. She reports the pt continues to struggle with his depressive symptoms. Last night, the pt said, \"It's just so hard. Nothing is different.\" Pt's mother explained to him that the venlafaxine can take four weeks to become effective. Pt is understanding of this, but also frustrated with the process. He's aware that ECT would be a faster treatment method, but is still resistant to trying this. Writer inquired about any safety concerns, which he currently denies, but he did tell Carmen that his depressive thoughts get worse at night. Pt's family have been monitoring him closely, as he's currently staying with his parents due to his mental state. Carmen is also going to work with him to set up CBT.      Offered earlier appt with the provider for 3/1, but Carmen believes the pt would prefer to wait until 3/15 (current future appt). Confirmed that Carmen only wanted to provide an update and discuss when venlafaxine may be effective. Writer provided education regarding the medication.   "

## 2019-02-27 NOTE — TELEPHONE ENCOUNTER
[facility] Call Center    Phone Message    May a detailed message be left on voicemail: yes    Reason for Call: Other: Patient will be faxing over paper work for an extension for leave of absence. The patient will try to fax it over today.     Action Taken: Other: Katja STOLL         Thank You:)    Letty Denney   Patient Representative   [facility]  [phone]

## 2019-02-27 NOTE — TELEPHONE ENCOUNTER
[facility] Call Center    Phone Message    May a detailed message be left on voicemail: yes    Reason for Call: Other: Please include Treatment plan and progress notes in the extension of the leave of absence.     Action Taken: Message routed to:  Other: Katja STOLL         Thank You:)    Letty Denney   Patient Representative   [facility]  [phone]

## 2019-03-05 NOTE — TELEPHONE ENCOUNTER
Writer called pt, as this writer has not received the forms and it appears this has not been documented by other staff in the patient's chart. Pt reports these should come through soon and is not surprised that these have not been received yet. Agreed to keep looking for the forms.

## 2019-03-19 ENCOUNTER — TELEPHONE (OUTPATIENT)
Dept: PSYCHIATRY | Facility: CLINIC | Age: 24
End: 2019-03-19

## 2019-03-19 NOTE — TELEPHONE ENCOUNTER
First Episode Psychosis Program    Incoming/Outgoing Call  [facility]      Outgoing call to: Madhu    Reason for Call: Coordination of Care  Response/Plan:  Writer contacted Madhu to discuss therapy scheduling.  Madhu said that he still hasn't gotten around to it, and that he would probably do it tomorrow.  Writer offered to do a phone conference to call with Madhu to schedule the appointment, but he declined.  Writer asked Madhu if it would be helpful to explore other therapists in the area-he said that he was not interested.  Writer asked Madhu if there was anything that would be helpful in coordinating the visit-he said that he has just been very overwhelmed with everything. He reitterated that he would call tomorrow to schedule the appointment, and was agreeable to writer doing a weekly check-in on his symptoms along with the progress of setting up the appointment.       Will route to patient's current psychiatric provider(s) as an FYI.   Please call or EPIC message with any questions or concerns.    HIPOLITO Payan learner  [phone]

## 2019-03-22 DIAGNOSIS — F32.3 MAJOR DEPRESSIVE DISORDER WITH PSYCHOTIC FEATURES (H): ICD-10-CM

## 2019-03-22 NOTE — TELEPHONE ENCOUNTER
"Medication requested: OLANZAPINE  Last refilled: 2/22/2019  Qty: 30      Last seen: 2/22/2019  Note excerpted from 2/22/19  \"1) PSYCHOTROPIC MEDICATIONS:  - Decrease olanzapine to 5mg at bedtime  - Decrease escitalopram to 20mg  - Increase venlafaxine XL to 150mg and then again to 225 mg in 1 week if tolerating\"    RTC: 2-3 weeks  Cancel: 1  No-show: 0  Next appt: 4/5/2019    Refill decision: Refill pended and routed to the provider for review/determination due to gap in care and cancel x 1.           "

## 2019-03-26 RX ORDER — OLANZAPINE 5 MG/1
5 TABLET ORAL AT BEDTIME
Qty: 30 TABLET | Refills: 0 | Status: SHIPPED | OUTPATIENT
Start: 2019-03-26 | End: 2019-04-05

## 2019-03-27 ENCOUNTER — TELEPHONE (OUTPATIENT)
Dept: PSYCHIATRY | Facility: CLINIC | Age: 24
End: 2019-03-27

## 2019-03-27 NOTE — TELEPHONE ENCOUNTER
Writer received incoming call from patient [phone]. Patient at this time is inquiring if the forms from [employer] for CASSIE extension were received. Writer informed patient the forms have not been received. Provided patient with fax number. Patient appreciated this writer. Writer agreed to reach out to patient once the forms are received via fax.

## 2019-03-27 NOTE — TELEPHONE ENCOUNTER
Writer received second incoming phone call from the pt. He received additional information from Sonya as to what is needed from this clinic. Pt reports they need:    Office visit notes  Treatment plans  Progress notes    Asked that pt complete CHIDI first. Blank form emailed to pt at [email or web address]. Pt will return to general psych email.

## 2019-04-05 ENCOUNTER — OFFICE VISIT (OUTPATIENT)
Dept: PSYCHIATRY | Facility: CLINIC | Age: 24
End: 2019-04-05
Attending: PSYCHIATRY & NEUROLOGY
Payer: COMMERCIAL

## 2019-04-05 ENCOUNTER — TELEPHONE (OUTPATIENT)
Dept: PSYCHIATRY | Facility: CLINIC | Age: 24
End: 2019-04-05

## 2019-04-05 DIAGNOSIS — F32.3 MAJOR DEPRESSIVE DISORDER WITH PSYCHOTIC FEATURES (H): ICD-10-CM

## 2019-04-05 DIAGNOSIS — F33.3 SEVERE RECURRENT MAJOR DEPRESSIVE DISORDER WITH PSYCHOTIC FEATURES (H): ICD-10-CM

## 2019-04-05 RX ORDER — VENLAFAXINE HYDROCHLORIDE 150 MG/1
CAPSULE, EXTENDED RELEASE ORAL
Qty: 60 CAPSULE | Refills: 1 | Status: SHIPPED | OUTPATIENT
Start: 2019-04-05 | End: 2019-04-05

## 2019-04-05 RX ORDER — ESCITALOPRAM OXALATE 10 MG/1
10 TABLET ORAL AT BEDTIME
Qty: 30 TABLET | Refills: 1 | Status: SHIPPED | OUTPATIENT
Start: 2019-04-05 | End: 2019-04-16 | Stop reason: DRUGHIGH

## 2019-04-05 RX ORDER — VENLAFAXINE HYDROCHLORIDE 150 MG/1
300 CAPSULE, EXTENDED RELEASE ORAL DAILY
Qty: 60 CAPSULE | Refills: 1 | Status: SHIPPED | OUTPATIENT
Start: 2019-04-05 | End: 2019-05-24

## 2019-04-05 ASSESSMENT — PATIENT HEALTH QUESTIONNAIRE - PHQ9: SUM OF ALL RESPONSES TO PHQ QUESTIONS 1-9: 13

## 2019-04-05 NOTE — TELEPHONE ENCOUNTER
"Writer received a fax from [employer] stating \"Please verify the intended dosing. The dose increase seems quite large after the first week.\" Per Dr. Mendoza's last office visit note - Increase venlafaxine XL to 300mg. What was ordered     -venlafaxine (EFFEXOR-XR) 150 MG 24 hr capsule  -for 1 week, then increase to 3 tablets.    That would put the patient at 450 mg per day.     Writer routed a message to Dr. Mendoza for clarification.   "

## 2019-04-05 NOTE — PATIENT INSTRUCTIONS
- Discontinue the olanzapine.  - Decrease the escitalopram to 10mg.  - Increase the venlafaxine to 300mg.  - Increase CBT to weekly appointments.  - Follow-up with me on April 30.    Call if issues or if you experience worsening symptoms.

## 2019-04-05 NOTE — TELEPHONE ENCOUNTER
Order clarified by provider, new order sent to pharmacy. Writer called pharmacy to confirm updated order had been received.

## 2019-04-05 NOTE — PROGRESS NOTES
"FIRST EPISODE PSYCHOSIS PROGRAM  [facility] PSYCHIATRY CLINIC    FOLLOW-UP VISIT         CARE TEAM:  PCP- Dontrell Baltazar    Specialty Providers- CALI LopezSAtilio ([facility])    Therapist- yes, private practice in [facility] Team- no.    Madhu Gibson is a 23 year old male who prefers the name Madhu & pronouns he, him.    Referred by: Dr. Duong  Referred for evaluation of:  Psychosis, depression  History Provided by:  patient and parents who were good historians    Parents Fredy     Pertinent Background: This patient first experienced symptoms of depression around the age 10 or 11 and has received treatment since that time. Limited med exposure. Developed symptoms of psychosis and had suicide attempt in December 2017 and remained depressed/psychotic for approximately 1 year before referral to first episode psychosis program here at the [facility].    Psych critical item history includes suicide attempt [single], suicidal ideation, psychosis [sxs include AH, VH, possible paranoia] and psych hosp (<3).     INTERIM HISTORY                                                                                                                 4, 4   The patient reports good treatment adherence.  History was provided by the patient and family who were good historians.  The last visit ended with the following med change: started VLFX, reduced OLZ.     Since the last visit:     Depression severity 7/10  \"normal for me.\" \"Dull feeling.\" \"Emptiness.\"  Would like to be able to enjoy stuff more.  Increased VLFX dose to 225 about 2-3 weeks ago  Has been reading a lot. [place] a bit more. Gets some enjoyment, which is perhaps a modest improvement  SI -- passing thoughts about half days of the week. No plans or intents.  Denies AH. \"Pretty much gone.\" Hasn't had them in 2 months.  Sleeping 10-11 hours, normal for him would 8.  Not sure if meds are helping, \"I'm not sure what should be different.\"    RECENT " SYMPTOMS:   DEPRESSION:  reports-suicidal ideation, depressed mood, anhedonia, low energy, hypersomnia, poor concentration /memory, feeling worthless, feeling hopeless and feeling trapped;  DENIES- excessive crying  WALT/HYPOMANIA:  reports-none;  DENIES- increased energy, decreased sleep need and increased activity  PSYCHOSIS:  reports-negative symptoms (avolition, affective flattening, anhedonia, alogia, apathy, social withdrawal);  DENIES- disorganized behavior  DYSREGULATION:  reports-none;  DENIES- SIB, mood dysregulation, aggressive and physically agitated  ANXIETY:  excessive worry  SLEEP:  As above     RECENT SUBSTANCE USE:     ALCOHOL- Drinks about 1 beer every month      TOBACCO- no     CAFFEINE- Not discussed at this time  OPIOIDS- no         NARCAN KIT- N/A        CANNABIS- no            OTHER ILLICIT DRUGS- none      CURRENT SOCIAL HISTORY:  FINANCIAL SUPPORT- Currently working part-time at a [employer] though would be working full-time if not for symptoms of mental illness       CHILDREN- no      LIVING SITUATION- lives with 3 friends in apt near [facility]      SOCIAL/ SPIRITUAL SUPPORT- parents, friends     FEELS SAFE AT HOME- no, persistent SI     MEDICAL ROS (2,10):  Reports A comprehensive review of systems was performed and is negative other than noted in the HPI.      Denies A comprehensive review of systems was performed and is negative other than noted in the HPI.    PSYCH and CD Critical Summary Points since July 2018 January 2019 -- intake; hospitalized briefly for ECT but decided against  February 2019 -- initiated VLFX with plan to discontinue escitalopram at future date; continue olanzapine    -- Increased VLFX and reduced escitalopram and olanzapine  April 2019 -- Increased VLFX to 300mg, decr escitalopram to 10mg, DCed OLZ    PAST PSYCH MED TRIALS     Currently olanzapine and escitalpram  H/o aripiprazole    MEDICAL / SURGICAL HISTORY                                    Neurologic Hx- none reported   Patient Active Problem List   Diagnosis     Psychosis, unspecified psychosis type (H)     Suicidal ideation     Major depressive disorder with psychotic features (H)       No past surgical history on file.     ALLERGY                                Erythromycin-sulfisoxazole  MEDICATIONS                               Current Outpatient Medications   Medication Sig Dispense Refill     cholecalciferol (VITAMIN D3) 5000 units (125 mcg) capsule Take 5,000 Units by mouth daily       escitalopram (LEXAPRO) 20 MG tablet Take 1 tablet (20 mg) by mouth At Bedtime 45 tablet 0     metFORMIN (GLUCOPHAGE) 500 MG tablet Take 1 tablet (500 mg) by mouth 2 times daily (with meals) 60 tablet 0     OLANZapine (ZYPREXA) 5 MG tablet Take 1 tablet (5 mg) by mouth At Bedtime 30 tablet 0     venlafaxine (EFFEXOR-XR) 75 MG 24 hr capsule Take 2 tablets for 1 week, then increase to 3 tablets. 60 capsule 1     VITALS                                                                                                                            3, 3   There were no vitals taken for this visit.   MENTAL STATUS EXAM                                                                                    9, 14 cog gs     Alertness: alert  and oriented  Appearance: casually groomed  Behavior/Demeanor: passive, with fair  eye contact   Speech: slowed  Language: intact  Psychomotor: slowed  Mood: depressed  Affect: flat; was congruent to mood; was congruent to content  Thought Process/Associations: linear, organized  Thought Content:  Reports suicidal ideation;  Denies preoccupations, obsessions , magical thinking and paranoid ideation  Perception:  Reports none;  Denies auditory hallucinations and visual hallucinations  Insight: adequate  Judgment: adequate for safety  Cognition: (6) does  appear grossly intact; formal cognitive testing was not done  Gait and Station: unremarkable    LABS and DATA     AIMS:  not done at this  "time    PHQ9 TODAY = 14     No flowsheet data found.     EKG QTc = 429 on 02/08/19    PSYCHIATRIC DIAGNOSES                                                                                               Major Depressive Disorder, Recurrent, Severe; with psychotic features  R/o Schizophrenia Spectrum Disorder such as Schizoaffective, Depressed Type     ASSESSMENT                                                                                                          m2, h3     Madhu Gibson is a 23-year-old  male with a history of childhood onset depressive symptoms who presents for evaluation of psychotic symptoms that emerged following a suicide attempt approximately 13 months ago. Per original evaluation:    He was referred by his primary outpatient psychiatrist in [place] for second opinion/consultation.  Patient has a long history of depressive symptoms including anergia, anhedonia, low mood, and sleep disturbances, along with anxious rumination.  The symptoms evolved to include psychotic symptoms that have lingered despite treatment with combination of antidepressant and antipsychotic meds.  He apparently had limited to no response with aripiprazole and has only had a very small improvements, if any, with recent change to olanzapine. He did notice a modest improvement when on lower dose of olanzapine and aripiprazole (amidst cross-titration). He is also describing suicidal thoughts, sometimes active with a plan to stab himself, because he is \"tired of feeling empty all the time.\"  His family history is noted for several mood disorders including borderline, depression, and bipolar disorder. He does not have any medical comorbidities. On exam, he presents as very flat, withdrawn, and depressed. His cognition, mood is slowed, is linear, organized, and goal-directed.    Today    Madhu has started CBT for depression. Discussed increasing to weekly. Reports modest improvements in anhedonia, though continues to " experience periodic passive SI and emptiness. Rates severity of depression as 7 out of 10.    Discussed med changes as below. Will call if worsening symptoms. Looking quite sedated/flat, which suspect is in part related to OLZ.    Other med changes to consider, should these fail, including lithium > bupropion > modafinil. Ketamine infusions may also be appropriate, though would need to coordinate with Ron.    Discussed again that he would be a good candidate for ECT should medication changes not suffice.    [place] PRESCRIPTION MONITORING PROGRAM [] was checked today:  not using controlled substances.    PSYCHOTROPIC DRUG INTERACTIONS:   Concurrent use of ESCITALOPRAM and QT INTERVAL-PROLONGING DRUGS may result in increased risk of QT-interval prolongation. .  MANAGEMENT:  periodic EKGs     PLAN                                                                                                                        m2, h3     1) PSYCHOTROPIC MEDICATIONS:  - Discontinue olanzapine  - Decrease escitalopram to 10mg  - Increase venlafaxine XL to 300mg    2) THERAPY: continue CBT for depression with [facility] therapist Vincent    3) NEXT DUE:    Labs- no  EKG- QTc = 429 on 2/8/19  Rating Scales- needs AIMS    4) REFERRALS:    None for now    5) RTC: April 30    6) CRISIS NUMBERS:   Provided routinely in AVS.    ONLY if a [facility] PT: [facility] [phone] (clinic), [phone] (after hours)     TREATMENT RISK STATEMENT:  The risks, benefits, alternatives and potential adverse effects have been discussed and   are understood by the pt. The pt understands the risks of using street drugs or alcohol. There are no medical contraindications, the pt agrees to treatment with the ability to do so. The pt knows to call the clinic for any problems or to access emergency care if needed.  Medical and substance use concerns are documented above.  Psychotropic drug interaction check was done, including changes made  today.    PROVIDER: Huber Mendoza MD    Staffed with Dr. Gardner.    I saw the patient with the resident, and participated in key portions of the service, including the mental status examination and developing the plan of care. I reviewed key portions of the history with the resident. I agree with the findings and plan as documented in this note.    Bijal Gardner

## 2019-04-05 NOTE — TELEPHONE ENCOUNTER
Medication Question   Received: Today   Message Contents   Ronnie Rowley [facility]   Phone Number: [phone]             Patient is at [facility] in May. Pharmacy called because they need a clarification on the venlafaxine (EFFEXOR-XR) 150 MG 24 hr capsule dosage. Pharmacist thinks the increase seems very high and wants to make sure that the correct dose is what they have on file or if that was a mistype.     Please call ASAP for clarification, patient is at pharmacy waiting for clarification. Number listed is for the pharmacy      -Writer reached out to Dr. Mendoza to confirm the dosage.

## 2019-04-08 NOTE — TELEPHONE ENCOUNTER
Олег, Katja Serrano, RN   Phone Number: [phone]             Hello,     This patients mother left a vm on the clinic phone stating they're having troubles with the Pharmacy they informed Dr. Mendoza to send medications to. There's something wrong with the amount that was prescribed. The mother didn't really go into detail over the vm.     Thank You:)     Letty Denney   Patient Representative   [facility]   [phone]        Writer called pt's mother, as it appears this issue was resolved on Friday. No answer. LVM requesting a c/b if issues remain.

## 2019-04-10 ENCOUNTER — TELEPHONE (OUTPATIENT)
Dept: PSYCHIATRY | Facility: CLINIC | Age: 24
End: 2019-04-10

## 2019-04-10 NOTE — TELEPHONE ENCOUNTER
First Episode Psychosis Program    Incoming/Outgoing Email  [facility]    Correspondence with: Brian Arthur, pt's father    Reason for contact & Plan:  Recent ED visit, increase in symptoms  Gave information about how to contact Dr. Mendoza's nurse related to medication questions or concerns.  Will look for a sooner appt on Dr. Mendoza's schedule if determined necessary by provider.  Will forward on to Katja Carrasquillo and Dr. Mendoza to determine next steps.      Content:    From: [email or web address]  Sent: Wednesday, April 10, 2019 1:18 PM  To: Eden Green  Subject: RE: Re: Re: [FIRSTEPISODE] No Family Group tonight     This message was sent securely using [employer]   Thank you Contreras, my concern is that Madhu typically will understate his condition with providers and he excluded us on Friday. I'm sure he wanted to avoid us disagreeing with his status in front of Doctor. I and Carmen, his Mother are in contact with him daily multiple times a day and he has times when he seems better but by far the majority of the time he is sleeping the majority of the day and not engaging with freinds or family and very down.   ___________________________________________________________________     --- Originally sent by [email or web address] on Apr 10, 2019 1:04 PM ---    This message was sent securely using [employer]Job      I did not send it to Dr. Gardner, as Dr. Mendoza is the treating provider and reviews his cases directly with Dr. Gardner.  However, I will send this to her as well per your request.          Eden Green, HIPOLITO, SW  First Episode of Psychosis- Strengths Program   Coordinator & Family Clinician     [facility]  [facility], 2nd floor  [address], [address]  [place] [address]  Direct Phone: [phone]  Fax: [phone]     ___________________________________________________________________   From: [email or web address]  [email or web address]  Sent: Wednesday, April 10, 2019 12:54 PM  To: Eden Green  Subject: RE: Re: [FIRSTEPISODE] No Family Group tonight     This message was sent securely using [employer]     Thank you Contreras,   Can you tell me if this was forwarded as well to Dr. Gardner?   B.   ___________________________________________________________________      --- Originally sent by [email or web address] on Apr 10, 2019 10:17 AM ---    This message was sent securely using Savedaily      Hi Brian,  I am sorry that Madhu is struggling right now.  I will forward this on to his providers.  I am going to see if we find space to have Madhu in sooner for his next visit.  If not, the nurse (Katja) or Dr. SHEPPARD will likely reach out to check in or possibly make adjustments over the phone.       In the future, medication questions and concerns can be answered quickest by directly calling Dr. Mendoza's nurse, Katja Carrasquillo ([phone]).       Eden Green, HIPOLITO, SW  First Episode of Psychosis- Strengths Program   Coordinator & Family Clinician     [facility]  [facility], 2nd floor  [address], [address]  [place] [address]  Direct Phone: [phone]  Fax: [phone]    ___________________________________________________________________   From: Brian Gibson <[email or web address]>  Sent: Wednesday, April 10, 2019 10:03 AM  To: Carmen Green  Subject: Re: [FIRSTEPISODE] No Family Group tonight     CAUTION: This email originated from outside of the organization. Do not click links or open attachments unless you recognize the sender and know the content is safe.  Contreras,   Our son, Madhu Gibson had an appointment with Dr. Rice on Friday 4/5. Some changes were made to the meds he takes. He wasn't forthcoming with what the changes are or what side effects may accompany them. He is more distant, less open to discussing how he feels. Last night Carmen (his Mother) got a  call from him asking if she'd drive to [place] to take him to the emergency room. We live 35 minutes away and before she got there he'd had a roommate take him. He eventually was released and under protest Carmen got him to come home.   At one point one of the team involved in Madhu's care, the  [facility] offered to help get appointments made and such.   Dr. Rice walked Madhu out of the appointment to the waiting area and since we weren't invited into the appointment I took the opportunity and asked about ketamine infusion or nasal application and its possible benefits. He responded that may be an option if the change in meds isn't effective.   Also in discussion with a family member about Madhu, the question of a psychostimulant like Adderall was brought up. I promised to pass the suggestion along. Especially since they had brought ketamine up originally.   Madhu is tired of feeling this way and we're really concerned that he is withdrawing further and where that may lead. When we met with the team at one point, Dr. Bijal Gardner joined the discussion. Contreras would you also please forward this and previous notes to her.    Thank you,   Brian Gibson  [phone]      Will route to patient's current psychiatric provider(s) as an FYI.   Please call or EPIC message with any questions or concerns.    HIPOLITO Villagomez, [facility]  [phone]

## 2019-04-10 NOTE — TELEPHONE ENCOUNTER
"Writer called pt's fatherBrian at the number provided in the email. No answer and VM full. Called pt's mother, Carmen and was able to gather more information, although she said the pt does not always share information with them.     Per mom, she checks in with the pt daily. Yesterday, the pt told his mother he's tired of \"feeling bad\" and \"feeling lost.\" At that time, the pt was unable to contract for safety, so Carmen said she was going down to his home in [place]. Later, she was notified that he already went into the ED with a friend. Carmen's uncertain why he decided to go in urgently at that time, as he would not tell her why. Pt was eventually discharged from the ED. The pt and his parents got home at 4 am. He is now at his parents home sleeping. Once he wakes, Carmen said she will work on setting up appts with him. She confirmed that the pt made the med adjustments recommended at the last appt with the provider. Mom was informed at that appt, that the pt may experience increased energy before an increase in mood. Although, Carmen reports not seeing either of these. Parents are still interested in other options, such as ketamine infusions and a psycho stimulant, but will call insurance to see if this would be covered first.     At this time, writer encouraged pt/parents to schedule sooner appt. Mom agreed to bring pt in on 4/16 at 11 am. This was approved by provider. Message sent to scheduling.     Lastly, writer and mom discussed a safety plan. She informed writer that if any safety concerns arise or pt voices increased symptoms similar to yesterday, family will bring pt to the ED.   "

## 2019-04-11 ENCOUNTER — TELEPHONE (OUTPATIENT)
Dept: PSYCHIATRY | Facility: CLINIC | Age: 24
End: 2019-04-11

## 2019-04-11 NOTE — TELEPHONE ENCOUNTER
First Episode Psychosis Program    Incoming/Outgoing Email  [facility]    Correspondence with: Brian pt's father    Reason for contact:  Update re: work    Content:    From: [email or web address] <[email or web address]>  Sent: Thursday, April 11, 2019 12:19 PM  To: Eden Green  Subject: RE: Re: Re: [FIRSTEPISODE] No Family Group tonight     This message was sent securely using [employer]   Jewel,   A new bit of news that is important for Dr. Rice to know. Madhu called his workplace yesterday afternoon and was told he'd been terminated. He was on disability leave that Dr. Rice created a letter to support. I believe it was somewhat open-ended but I didn't see it. Can a copy of that be available on Tuesday, please? As you can imagine the news really hit Madhu hard, rather shattered him. We have him at our home to watch and care for him. Returning to work was something that he was holding on to as a goal. He really liked working at [employer], helping customers and being part of the team.      B.    Will route to patient's current psychiatric provider(s) as an FYI.   Please call or EPIC message with any questions or concerns.    Eden Green, HIPOLITO, [facility]  [phone]

## 2019-04-16 ENCOUNTER — OFFICE VISIT (OUTPATIENT)
Dept: PSYCHIATRY | Facility: CLINIC | Age: 24
End: 2019-04-16
Attending: STUDENT IN AN ORGANIZED HEALTH CARE EDUCATION/TRAINING PROGRAM
Payer: COMMERCIAL

## 2019-04-16 VITALS — SYSTOLIC BLOOD PRESSURE: 126 MMHG | HEART RATE: 105 BPM | WEIGHT: 259.4 LBS | DIASTOLIC BLOOD PRESSURE: 79 MMHG

## 2019-04-16 DIAGNOSIS — F32.3 MAJOR DEPRESSIVE DISORDER WITH PSYCHOTIC FEATURES (H): ICD-10-CM

## 2019-04-16 PROCEDURE — G0463 HOSPITAL OUTPT CLINIC VISIT: HCPCS | Mod: ZF

## 2019-04-16 RX ORDER — ESCITALOPRAM OXALATE 5 MG/1
5 TABLET ORAL DAILY
Qty: 30 TABLET | Refills: 0 | Status: SHIPPED | OUTPATIENT
Start: 2019-04-16 | End: 2019-05-16

## 2019-04-16 ASSESSMENT — PATIENT HEALTH QUESTIONNAIRE - PHQ9: SUM OF ALL RESPONSES TO PHQ QUESTIONS 1-9: 14

## 2019-04-16 ASSESSMENT — PAIN SCALES - GENERAL: PAINLEVEL: NO PAIN (0)

## 2019-04-16 NOTE — PROGRESS NOTES
FIRST EPISODE PSYCHOSIS PROGRAM  [facility] PSYCHIATRY CLINIC    FOLLOW-UP VISIT         CARE TEAM:  PCP- Dontrell Baltazar    Specialty Providers- CRISTY LopezBAtilioSAtilio ([facility])    Therapist- yes, private practice in [facility] Team- no.    Madhu Gibson is a 23 year old male who prefers the name Madhu & pronouns he, him.    Referred by: Dr. Duong  Referred for evaluation of:  Psychosis, depression  History Provided by:  patient and parents who were good historians    Parents Fredy     Pertinent Background: This patient first experienced symptoms of depression around the age 10 or 11 and has received treatment since that time. Limited med exposure. Developed symptoms of psychosis and had suicide attempt in December 2017 and remained depressed/psychotic for approximately 1 year before referral to first episode psychosis program here at the [facility].    Psych critical item history includes suicide attempt [single], suicidal ideation, psychosis [sxs include AH, VH, possible paranoia] and psych hosp (<3).     INTERIM HISTORY                                                                                                                 4, 4   The patient reports good treatment adherence.  History was provided by the patient and family who were good historians.  The last visit ended with the following med change: inc VLFX and DCed OLZ.     Joined by parents, who provide collateral    Since the last visit:     Depression severity 5-6/10 this week  Was in ED for acute SI for a few hours last week, DCed home  Hasn't actually started psychotherapy yet, goes this coming Monday  Feels depression is about the same as when starting in clinic. Hasn't noticed any improvements since inc of VLFX 2 weeks ago.  Was laid off by [employer] due to missed work and now pursuing whether he was legally terminated since he was taking FMLA  Making concerted effort to game, read    RECENT SYMPTOMS:   DEPRESSION:   reports-suicidal ideation, depressed mood, anhedonia, low energy, hypersomnia, poor concentration /memory, feeling worthless, feeling hopeless and feeling trapped;  DENIES- excessive crying  WALT/HYPOMANIA:  reports-none;  DENIES- increased energy, decreased sleep need and increased activity  PSYCHOSIS:  reports-none;  DENIES- disorganized behavior  DYSREGULATION:  reports-none;  DENIES- SIB, mood dysregulation, aggressive and physically agitated  ANXIETY:  excessive worry, rumination about depression  SLEEP:  As above     RECENT SUBSTANCE USE:     ALCOHOL- Drinks about 1 beer every month      TOBACCO- no     CAFFEINE- Not discussed at this time  OPIOIDS- no         NARCAN KIT- N/A        CANNABIS- no            OTHER ILLICIT DRUGS- none      CURRENT SOCIAL HISTORY:  FINANCIAL SUPPORT- Currently working part-time at a [employer] though would be working full-time if not for symptoms of mental illness       CHILDREN- no      LIVING SITUATION- lives with 3 friends in apt [place] [facility]      SOCIAL/ SPIRITUAL SUPPORT- parents, friends     FEELS SAFE AT HOME- no, persistent SI     MEDICAL ROS (2,10):  Reports A comprehensive review of systems was performed and is negative other than noted in the HPI.      Denies A comprehensive review of systems was performed and is negative other than noted in the HPI.    PSYCH and CD Critical Summary Points since July 2018 January 2019 -- intake; hospitalized briefly for ECT but decided against  February 2019 -- initiated VLFX with plan to discontinue escitalopram at future date; continue olanzapine    -- Increased VLFX and reduced escitalopram and olanzapine  April 2019 -- Increased VLFX to 300mg, decr escitalopram to 10mg, DCed OLZ  4/16/19 -- Dec escitalopram to 5mg    PAST PSYCH MED TRIALS     Escitalopram -- several years, include 2-3 years on 30 mg, limited effect  Olanzapine -- for 6 months due to depression with psychosis; DCed April 2019 due to  sedation  Aripiprazole -- unclear trial    MEDICAL / SURGICAL HISTORY                                   Neurologic Hx- none reported   Patient Active Problem List   Diagnosis     Psychosis, unspecified psychosis type (H)     Suicidal ideation     Major depressive disorder with psychotic features (H)       No past surgical history on file.     ALLERGY                                Erythromycin-sulfisoxazole  MEDICATIONS                               Current Outpatient Medications   Medication Sig Dispense Refill     cholecalciferol (VITAMIN D3) 5000 units (125 mcg) capsule Take 5,000 Units by mouth daily       escitalopram (LEXAPRO) 10 MG tablet Take 1 tablet (10 mg) by mouth At Bedtime 30 tablet 1     venlafaxine (EFFEXOR-XR) 150 MG 24 hr capsule Take 2 capsules (300 mg) by mouth daily 60 capsule 1     VITALS                                                                                                                            3, 3   /79   Pulse 105   Wt 117.7 kg (259 lb 6.4 oz)    MENTAL STATUS EXAM                                                                                    9, 14 cog gs     Alertness: alert  and oriented  Appearance: casually groomed  Behavior/Demeanor: passive, with fair  eye contact   Speech: slowed  Language: intact  Psychomotor: slowed  Mood: depressed  Affect: flat; was congruent to mood; was congruent to content  Thought Process/Associations: linear, organized  Thought Content:  Reports suicidal ideation;  Denies preoccupations, obsessions , magical thinking and paranoid ideation  Perception:  Reports none;  Denies auditory hallucinations and visual hallucinations  Insight: adequate  Judgment: adequate for safety  Cognition: (6) does  appear grossly intact; formal cognitive testing was not done  Gait and Station: unremarkable    LABS and DATA     AIMS:  not done at this time    PHQ9 TODAY = 14     PHQ-9 SCORE 4/5/2019   PHQ-9 Total Score 13        EKG QTc = 429 on  "02/08/19    PSYCHIATRIC DIAGNOSES                                                                                               Major Depressive Disorder, Recurrent, Severe; with h/o psychotic features (psychotic fx resolved now)     ASSESSMENT                                                                                                          m2, h3     Madhu Gibson is a 23-year-old  male with a history of childhood onset depressive symptoms who presents for evaluation of psychotic symptoms that emerged following a suicide attempt approximately 13 months ago. Per original evaluation:    He was referred by his primary outpatient psychiatrist in [place] for second opinion/consultation.  Patient has a long history of depressive symptoms including anergia, anhedonia, low mood, and sleep disturbances, along with anxious rumination.  The symptoms evolved to include psychotic symptoms that have lingered despite treatment with combination of antidepressant and antipsychotic meds.  He apparently had limited to no response with aripiprazole and has only had a very small improvements, if any, with recent change to olanzapine. He did notice a modest improvement when on lower dose of olanzapine and aripiprazole (amidst cross-titration). He is also describing suicidal thoughts, sometimes active with a plan to stab himself, because he is \"tired of feeling empty all the time.\"  His family history is noted for several mood disorders including borderline, depression, and bipolar disorder. He does not have any medical comorbidities. On exam, he presents as very flat, withdrawn, and depressed. His cognition, mood is slowed, is linear, organized, and goal-directed.    Today    Presents for follow-up reporting he is about the same. He was in the ED 1 week ago for acute SI, though never admitted. Hasn't noticed any significant improvements with VLFX inc, though this was only about 11 days ago. Would like to give VLFX a solid " 3-4 weeks before moving to next option.    Hasn't actually started psychotherapy yet, contrary to his previous reports.     After appt, called pt advising he lower escitalopram. Decreased to 10mg at last appt. Plan is to entirely discontinue.    Other med changes to consider are lithium > bupropion > modafinil. Ketamine infusions may also be appropriate, though would need to coordinate with [facility].    As before, discussed again that he would be a good candidate for ECT should medication changes not suffice. Has uncle in [place].    [place] PRESCRIPTION MONITORING PROGRAM [] was checked today:  not using controlled substances.    PSYCHOTROPIC DRUG INTERACTIONS:   Concurrent use of ESCITALOPRAM and QT INTERVAL-PROLONGING DRUGS may result in increased risk of QT-interval prolongation. .  MANAGEMENT:  periodic EKGs     PLAN                                                                                                                        m2, h3     1) PSYCHOTROPIC MEDICATIONS:  - Decrease escitalopram to 5mg  - Continue venlafaxine XL to 300mg    2) THERAPY: continue CBT for depression with [place] therapist Vincent    3) NEXT DUE:    Labs- no  EKG- QTc = 429 on 2/8/19  Rating Scales- needs AIMS    4) REFERRALS:    None for now    5) RTC: 2 weeks, pt will transfer to gen clinic in July    6) CRISIS NUMBERS:   Provided routinely in AVS.    ONLY if a [facility]: [facility] [phone] (clinic), [phone] (after hours)     TREATMENT RISK STATEMENT:  The risks, benefits, alternatives and potential adverse effects have been discussed and   are understood by the pt. The pt understands the risks of using street drugs or alcohol. There are no medical contraindications, the pt agrees to treatment with the ability to do so. The pt knows to call the clinic for any problems or to access emergency care if needed.  Medical and substance use concerns are documented above.  Psychotropic drug interaction check was done,  including changes made today.    PROVIDER: Huber Mendoza MD    Not staffed in clinic. Will route to Dr. Gardner.    I did not see this pt directly. I have reviewed the documentation, and I agree with the resident's plan of care.    Bijal Gardner

## 2019-04-16 NOTE — NURSING NOTE
Chief Complaint   Patient presents with     RECHECK     Severe recurrent major depressive disorder with psychotic features

## 2019-04-26 ENCOUNTER — TELEPHONE (OUTPATIENT)
Dept: PSYCHIATRY | Facility: CLINIC | Age: 24
End: 2019-04-26

## 2019-04-26 DIAGNOSIS — Z71.89 COUNSELING AND COORDINATION OF CARE: Primary | ICD-10-CM

## 2019-04-26 NOTE — TELEPHONE ENCOUNTER
"First Episode Psychosis Program    Incoming/Outgoing Call  [facility]    Outgoing call to: Madhu Vincent Burgos    Reason for Call:  Coordination of Care    Response/Plan:  Writer called Madhu to check in-he said that he met with therapist Vincent Burgos this week, and scheduled another appointment for next week.  He said that it was going \"fine.\" Writer asked Madhu if he would like assistance with exploring the disability leave at [employer].  Madhu said that he called his supervisor to check in, and was told that he was terminated because they could not get a hold of him.  Madhu said that he would consider assistance from the clinic in exploring if there could be anything done about this. He said he wanted to make some calls, and call back at a later time. Writer left a v/m with Vincent Burgos to confirm that Madhu started therapy in the clinic.       Will route to patient's current psychiatric provider(s) as an FYI.   Please call or EPIC message with any questions or concerns.    HIPOLITO Payan Learner  [phone]  "

## 2019-04-30 ENCOUNTER — OFFICE VISIT (OUTPATIENT)
Dept: PSYCHIATRY | Facility: CLINIC | Age: 24
End: 2019-04-30
Attending: PSYCHIATRY & NEUROLOGY
Payer: COMMERCIAL

## 2019-04-30 VITALS — SYSTOLIC BLOOD PRESSURE: 134 MMHG | HEART RATE: 108 BPM | DIASTOLIC BLOOD PRESSURE: 86 MMHG | WEIGHT: 254 LBS

## 2019-04-30 DIAGNOSIS — F33.3 SEVERE RECURRENT MAJOR DEPRESSIVE DISORDER WITH PSYCHOTIC FEATURES (H): ICD-10-CM

## 2019-04-30 PROCEDURE — G0463 HOSPITAL OUTPT CLINIC VISIT: HCPCS | Mod: ZF

## 2019-04-30 RX ORDER — VENLAFAXINE HYDROCHLORIDE 150 MG/1
300 CAPSULE, EXTENDED RELEASE ORAL DAILY
Qty: 60 CAPSULE | Refills: 1 | Status: CANCELLED | OUTPATIENT
Start: 2019-04-30

## 2019-04-30 ASSESSMENT — PATIENT HEALTH QUESTIONNAIRE - PHQ9: SUM OF ALL RESPONSES TO PHQ QUESTIONS 1-9: 8

## 2019-04-30 ASSESSMENT — PAIN SCALES - GENERAL: PAINLEVEL: NO PAIN (0)

## 2019-04-30 NOTE — PROGRESS NOTES
FIRST EPISODE PSYCHOSIS PROGRAM  [facility] PSYCHIATRY CLINIC    FOLLOW-UP VISIT         CARE TEAM:  PCP- Dontrell Baltazar    Specialty Providers- CALI LopezSAtilio ([facility])    Therapist- yes, private practice in [facility] Team- no.    Madhu Gibson is a 23 year old male who prefers the name Madhu & pronouns he, him.    Referred by: Dr. Duong  Referred for evaluation of:  Psychosis, depression  History Provided by:  patient and parents who were good historians    Parents Fredy     Pertinent Background: This patient first experienced symptoms of depression around the age 10 or 11 and has received treatment since that time. Limited med exposure. Developed symptoms of psychosis and had suicide attempt in December 2017 and remained depressed/psychotic for approximately 1 year before referral to first episode psychosis program here at the [facility].    Psych critical item history includes suicide attempt [single], suicidal ideation, psychosis [sxs include AH, VH, possible paranoia] and psych hosp (<3).     INTERIM HISTORY                                                                                                                 4, 4   The patient reports good treatment adherence.  History was provided by the patient and family who were good historians.  The last visit ended with the following med change: inc VLFX and DCed OLZ.     Joined by parents, who provide collateral    Since the last visit:     Bought a bike recently, has been biking around town and enjoying this.  Feeling a bit better. Easier to keep self occupied.  Sleeping 8-10 hours.   Has noticed headaches are bit more frequent. Started having HAs once every 2-3 days.  Also noticed maybe he gets angrier a bit easier lately, maybe the last 2 weeks. Not every day. Can get angry about his roommates complaining.   Reporting some upset stomach as well.  Much less frequent SI (primarily thoughts about not existing or being better  of dead; no suicide plan or intent)  Began psychotherapy since last appt    RECENT SYMPTOMS:   DEPRESSION:  reports-suicidal ideation, depressed mood, anhedonia, low energy, hypersomnia, poor concentration /memory, feeling worthless, feeling hopeless and feeling trapped;  DENIES- excessive crying  WALT/HYPOMANIA:  reports-none;  DENIES- increased energy, decreased sleep need and increased activity  PSYCHOSIS:  reports-none;  DENIES- disorganized behavior  DYSREGULATION:  reports-none;  DENIES- SIB, mood dysregulation, aggressive and physically agitated  ANXIETY:  excessive worry, rumination about depression  SLEEP:  As above     RECENT SUBSTANCE USE:     ALCOHOL- Drinks about 1 beer every month      TOBACCO- no     CAFFEINE- Not discussed at this time  OPIOIDS- no         NARCAN KIT- N/A        CANNABIS- no            OTHER ILLICIT DRUGS- none      CURRENT SOCIAL HISTORY:  FINANCIAL SUPPORT- Currently working part-time at a [employer] though would be working full-time if not for symptoms of mental illness       CHILDREN- no      LIVING SITUATION- lives with 3 friends in apt near [facility]      SOCIAL/ SPIRITUAL SUPPORT- parents, friends     FEELS SAFE AT HOME- no, persistent SI     MEDICAL ROS (2,10):  Reports A comprehensive review of systems was performed and is negative other than noted in the HPI.      Denies A comprehensive review of systems was performed and is negative other than noted in the HPI.    PSYCH and CD Critical Summary Points since July 2018 January 2019 -- intake; hospitalized briefly for ECT but decided against  February 2019 -- initiated VLFX with plan to discontinue escitalopram at future date; continue olanzapine    -- Increased VLFX and reduced escitalopram and olanzapine  April 2019 -- Increased VLFX to 300mg, decr escitalopram to 10mg, DCed OLZ  4/16/19 -- Dec escitalopram to 5mg  4/30/10 -- discontinue escitalopram    PAST PSYCH MED TRIALS     Escitalopram -- several  years, include 2-3 years on 30 mg, limited effect  Olanzapine -- for 6 months due to depression with psychosis; DCed April 2019 due to sedation  Aripiprazole -- unclear trial    MEDICAL / SURGICAL HISTORY                                   Neurologic Hx- none reported   Patient Active Problem List   Diagnosis     Psychosis, unspecified psychosis type (H)     Suicidal ideation     Major depressive disorder with psychotic features (H)       No past surgical history on file.     ALLERGY                                Erythromycin-sulfisoxazole  MEDICATIONS                               Current Outpatient Medications   Medication Sig Dispense Refill     cholecalciferol (VITAMIN D3) 5000 units (125 mcg) capsule Take 5,000 Units by mouth daily       escitalopram (LEXAPRO) 5 MG tablet Take 1 tablet (5 mg) by mouth daily 30 tablet 0     venlafaxine (EFFEXOR-XR) 150 MG 24 hr capsule Take 2 capsules (300 mg) by mouth daily 60 capsule 1     VITALS                                                                                                                            3, 3   /86   Pulse 108   Wt 115.2 kg (254 lb)    MENTAL STATUS EXAM                                                                                    9, 14 cog gs     Alertness: alert  and oriented  Appearance: casually groomed  Behavior/Demeanor: passive, with fair  eye contact , but more engaged than in past appts  Speech: slowed  Language: intact  Psychomotor: slowed  Mood: depressed  Affect: flat; was congruent to mood; was congruent to content; moderately more animated   Thought Process/Associations: linear, organized  Thought Content:  Reports consistent with interim hx above;  Denies preoccupations, obsessions , magical thinking and paranoid ideation  Perception:  Reports none;  Denies auditory hallucinations and visual hallucinations  Insight: adequate  Judgment: adequate for safety  Cognition: (6) does  appear grossly intact; formal cognitive  "testing was not done  Gait and Station: unremarkable    LABS and DATA     AIMS:  not done at this time    PHQ9 TODAY = 14     PHQ-9 SCORE 4/5/2019 4/16/2019   PHQ-9 Total Score 13 14        EKG QTc = 429 on 02/08/19    PSYCHIATRIC DIAGNOSES                                                                                               Major Depressive Disorder, Recurrent, Severe; with h/o psychotic features (psychotic fx resolved now)     ASSESSMENT                                                                                                          m2, h3     Madhu Gibson is a 23-year-old  male with a history of childhood onset depressive symptoms who presents for evaluation of psychotic symptoms that emerged following a suicide attempt approximately 13 months ago. Per original evaluation:    He was referred by his primary outpatient psychiatrist in [place] for second opinion/consultation.  Patient has a long history of depressive symptoms including anergia, anhedonia, low mood, and sleep disturbances, along with anxious rumination.  The symptoms evolved to include psychotic symptoms that have lingered despite treatment with combination of antidepressant and antipsychotic meds.  He apparently had limited to no response with aripiprazole and has only had a very small improvements, if any, with recent change to olanzapine. He did notice a modest improvement when on lower dose of olanzapine and aripiprazole (amidst cross-titration). He is also describing suicidal thoughts, sometimes active with a plan to stab himself, because he is \"tired of feeling empty all the time.\"  His family history is noted for several mood disorders including borderline, depression, and bipolar disorder. He does not have any medical comorbidities. On exam, he presents as very flat, withdrawn, and depressed. His cognition, mood is slowed, is linear, organized, and goal-directed.    Today    Returns for follow-up reporting some add'l " improvemnts, including more energy, less SI, less over sleeping. Looks less vegetative today than in past appts. Possibly experiencing some irritability which he describes as getting more easily angry but not angry all the time. In add'n is noting more frequent HAs and some GI upset. All of this may be related to VLFX, so elected to watch/wait. We did discuss possibility of further increasing VFLX if these symptoms improve in the next week.    Other med changes to consider are lithium > bupropion > modafinil. Ketamine infusions may also be appropriate, though would need to coordinate with [facility]. TMS would be reasonable. ECT also entirely appropriate given severity of depression (has uncle in [place] that he could stay with).     [place] PRESCRIPTION MONITORING PROGRAM [] was checked today:  not using controlled substances.    PSYCHOTROPIC DRUG INTERACTIONS:   Concurrent use of ESCITALOPRAM and QT INTERVAL-PROLONGING DRUGS may result in increased risk of QT-interval prolongation. .  MANAGEMENT:  periodic EKGs     PLAN                                                                                                                        m2, h3     1) PSYCHOTROPIC MEDICATIONS:  - Discontinue escitalopram to 5mg  - Continue venlafaxine XL to 300mg, but may increase to 450mg in 1-2 weeks  - Discontinue metformin    2) THERAPY: continue CBT for depression with [facility] therapist Vincent    3) NEXT DUE:    Labs- no  EKG- QTc = 429 on 2/8/19  Rating Scales- needs AIMS    4) REFERRALS:    None for now    5) RTC: 4 weeks, pt will transfer to general resident clinic in July, though I understand he is also looking at psychiatrists at [facility] (which I recommended to him).    6) CRISIS NUMBERS:   Provided routinely in AVS.    ONLY if a [facility] PT: [facility] [phone] (clinic), [phone] (after hours)     TREATMENT RISK STATEMENT:  The risks, benefits, alternatives and potential adverse effects have been  discussed and   are understood by the pt. The pt understands the risks of using street drugs or alcohol. There are no medical contraindications, the pt agrees to treatment with the ability to do so. The pt knows to call the clinic for any problems or to access emergency care if needed.  Medical and substance use concerns are documented above.  Psychotropic drug interaction check was done, including changes made today.    PROVIDER: Huber Mendoza MD    Not staffed in clinic. Will route to Dr. Gardner.    I did not see this pt directly. I have reviewed the documentation, and I agree with the resident's plan of care.    Bijal Gardner

## 2019-04-30 NOTE — NURSING NOTE
Chief Complaint   Patient presents with     Recheck Medication     Major depressive disorder with psychotic features

## 2019-05-13 ENCOUNTER — TELEPHONE (OUTPATIENT)
Dept: PSYCHIATRY | Facility: CLINIC | Age: 24
End: 2019-05-13

## 2019-05-13 NOTE — TELEPHONE ENCOUNTER
[facility] Call Center    Phone Message    May a detailed message be left on voicemail: yes    Reason for Call: Form or Letter   Type or form/letter needing completion: Department of Employment - unemployment insurance  Provider: Hbuer Mendoza MD  Date form needed: ASAP  Once completed: Info will be on form    Patient's father is dropping off the letter on 5/13/19.      Action Taken: Message routed to:  Other: KHALIDA Russell

## 2019-05-15 NOTE — TELEPHONE ENCOUNTER
Writer received completed form from provider, who requested writer reach out to pt / family for start date of pt's inability to perform any type of work.  Writer left voice mail message for pt's mother and father, requesting callback for info to complete form. Writer prompted her to request to speak to another nurse if writer was not available.    Writer received call from pt's father, who identified that pt would likely have this information.  Writer left voice mail message for pt, requesting callback. Writer prompted pt to request to speak to another nurse if writer was not available.      Writer received call from pt, who identified 1/5/19 as the beginning date of his inability to work. Pt requested that the form be faxed to the state and a copy mailed to him.  Lynnr faxed completed form to [phone], routed copy to scanning, mailed copy to pt.

## 2019-05-23 DIAGNOSIS — F33.3 SEVERE RECURRENT MAJOR DEPRESSIVE DISORDER WITH PSYCHOTIC FEATURES (H): ICD-10-CM

## 2019-05-24 ENCOUNTER — TELEPHONE (OUTPATIENT)
Dept: PSYCHIATRY | Facility: CLINIC | Age: 24
End: 2019-05-24

## 2019-05-24 DIAGNOSIS — F33.3 SEVERE RECURRENT MAJOR DEPRESSIVE DISORDER WITH PSYCHOTIC FEATURES (H): ICD-10-CM

## 2019-05-24 RX ORDER — VENLAFAXINE HYDROCHLORIDE 150 MG/1
300 CAPSULE, EXTENDED RELEASE ORAL DAILY
Qty: 60 CAPSULE | Refills: 0 | Status: SHIPPED | OUTPATIENT
Start: 2019-05-24 | End: 2019-06-04

## 2019-05-24 NOTE — TELEPHONE ENCOUNTER
[facility] Call Center    Phone Message    May a detailed message be left on voicemail: yes    Reason for Call: Medication Refill Request    Has the patient contacted the pharmacy for the refill? Yes   Name of medication being requested: venlafaxine (EFFEXOR-XR) 150 MG 24 hr capsule  Provider who prescribed the medication: Dr. Mendoza  Pharmacy: [facility] [address] [place] [address] [address]  Date medication is needed: Patient only has a couple days left       Action Taken: Message routed to:  Other: Nurses bank       Thank You:)    Letty Denney   Patient Representative   [facility]  [phone]

## 2019-05-25 RX ORDER — VENLAFAXINE HYDROCHLORIDE 150 MG/1
CAPSULE, EXTENDED RELEASE ORAL
Qty: 60 CAPSULE | Refills: 0 | OUTPATIENT
Start: 2019-05-25

## 2019-06-04 ENCOUNTER — OFFICE VISIT (OUTPATIENT)
Dept: PSYCHIATRY | Facility: CLINIC | Age: 24
End: 2019-06-04
Attending: PSYCHIATRY & NEUROLOGY
Payer: COMMERCIAL

## 2019-06-04 VITALS — WEIGHT: 249 LBS | SYSTOLIC BLOOD PRESSURE: 138 MMHG | HEART RATE: 118 BPM | DIASTOLIC BLOOD PRESSURE: 86 MMHG

## 2019-06-04 DIAGNOSIS — F33.3 SEVERE RECURRENT MAJOR DEPRESSIVE DISORDER WITH PSYCHOTIC FEATURES (H): ICD-10-CM

## 2019-06-04 PROCEDURE — G0463 HOSPITAL OUTPT CLINIC VISIT: HCPCS | Mod: ZF

## 2019-06-04 RX ORDER — VENLAFAXINE HYDROCHLORIDE 150 MG/1
450 CAPSULE, EXTENDED RELEASE ORAL DAILY
Qty: 90 CAPSULE | Refills: 2 | Status: SHIPPED | OUTPATIENT
Start: 2019-06-04 | End: 2019-07-04

## 2019-06-04 ASSESSMENT — PATIENT HEALTH QUESTIONNAIRE - PHQ9: SUM OF ALL RESPONSES TO PHQ QUESTIONS 1-9: 6

## 2019-06-04 ASSESSMENT — PAIN SCALES - GENERAL: PAINLEVEL: NO PAIN (0)

## 2019-06-04 NOTE — PROGRESS NOTES
FIRST EPISODE PSYCHOSIS PROGRAM  [facility]    FOLLOW-UP VISIT         CARE TEAM:  PCP- Dontrell Baltazar    Specialty Providers- CRISTY LopezBAtilioSAtilio ([facility])    Therapist- yes, private practice in [facility] Team- no.    Madhu Gibson is a 23 year old male who prefers the name Madhu & pronouns he, him.    Referred by: Dr. Duong  Referred for evaluation of:  Psychosis, depression  History Provided by:  patient and parents who were good historians    Parents Fredy     Pertinent Background: This patient first experienced symptoms of depression around the age 10 or 11 and has received treatment since that time. Limited med exposure. Developed symptoms of psychosis and had suicide attempt in December 2017 and remained depressed/psychotic for approximately 1 year before referral to first episode psychosis program here at the [facility].    Psych critical item history includes suicide attempt [single], suicidal ideation, psychosis [sxs include AH, VH, possible paranoia] and psych hosp (<3).     INTERIM HISTORY                                                                                                                 4, 4   The patient reports good treatment adherence.  History was provided by the patient and family who were good historians.  The last visit ended with the following med change: inc VLFX and DCed OLZ.     Joined by parents, who provide collateral    Since the last visit:     - Feeing a lot better. Mood better. More energy.  - Looking for PT jobs. Parents have been paying the rent.  - Dream job would be something abena-related. Had been thinking of moving to [place] for a job until MDE.  - Much more animated. Family say he looks better  - Has been on higher dose of VLFX (450) for about 1 month  - Headaches 2-3x weekly, Advil helps. This has been going on even before VLFX.  - Happy to have lost 10lb over last 6 weeks. Getting exercise, eating less. Has been playing  more D&D lately with friends.    RECENT SYMPTOMS:   DEPRESSION:  reports-suicidal ideation, depressed mood, anhedonia, low energy, hypersomnia, poor concentration /memory, feeling worthless, feeling hopeless and feeling trapped;  DENIES- excessive crying  WALT/HYPOMANIA:  reports-none;  DENIES- increased energy, decreased sleep need and increased activity  PSYCHOSIS:  reports-none;  DENIES- disorganized behavior  DYSREGULATION:  reports-none;  DENIES- SIB, mood dysregulation, aggressive and physically agitated  ANXIETY:  excessive worry, rumination about depression  SLEEP:  As above     RECENT SUBSTANCE USE:     ALCOHOL- Drinks about 1 beer every month      TOBACCO- no     CAFFEINE- Not discussed at this time  OPIOIDS- no         NARCAN KIT- N/A        CANNABIS- no            OTHER ILLICIT DRUGS- none      CURRENT SOCIAL HISTORY:  FINANCIAL SUPPORT- unemployed. Job-hunting.   CHILDREN- no      LIVING SITUATION- lives with 3 friends in apt near [facility]      SOCIAL/ SPIRITUAL SUPPORT- parents, friends     FEELS SAFE AT HOME- yes     MEDICAL ROS (2,10):  Reports A comprehensive review of systems was performed and is negative other than noted in the HPI.      Denies A comprehensive review of systems was performed and is negative other than noted in the HPI.    PSYCH and CD Critical Summary Points since July 2018 January 2019 -- intake; hospitalized briefly for ECT but decided against  February 2019 -- initiated VLFX with plan to discontinue escitalopram at future date; continue olanzapine    -- Increased VLFX and reduced escitalopram and olanzapine  April 2019 -- Increased VLFX to 300mg, decr escitalopram to 10mg, DCed OLZ  4/16/19 -- Dec escitalopram to 5mg  4/30/19 -- discontinue escitalopram  May 2019 -- Inc VLFX XL to 450mg daily    PAST PSYCH MED TRIALS     Limited med exposure    Escitalopram -- several years, include 2-3 years on 30 mg, limited effect  Olanzapine -- for 6 months due to  "depression with psychosis; DCed April 2019 due to sedation  Aripiprazole -- unclear trial    MEDICAL / SURGICAL HISTORY                                   Neurologic Hx- none reported   Patient Active Problem List   Diagnosis     Psychosis, unspecified psychosis type (H)     Suicidal ideation     Major depressive disorder with psychotic features (H)       No past surgical history on file.     ALLERGY                                Erythromycin-sulfisoxazole  MEDICATIONS                               *Venlafaxine XR 450mg daily is current dose.*  *Escitalopram discontinued 4/30/19.*    Current Outpatient Medications   Medication Sig Dispense Refill     cholecalciferol (VITAMIN D3) 5000 units (125 mcg) capsule Take 5,000 Units by mouth daily       venlafaxine (EFFEXOR-XR) 150 MG 24 hr capsule Take 2 capsules (300 mg) by mouth daily 60 capsule 0     escitalopram (LEXAPRO) 5 MG tablet Take 1 tablet (5 mg) by mouth daily 30 tablet 0     VITALS                                                                                                                            3, 3   /86   Pulse 118   Wt 112.9 kg (249 lb)    MENTAL STATUS EXAM                                                                                    9, 14 cog gs     Alertness: alert  and oriented  Appearance: casually groomed  Behavior/Demeanor: cooperative, pleasant and calm, with fair  eye contact , but more engaged than in past appts  Speech: slowed, but much better  Language: intact  Psychomotor: slowed, but improved  Mood: \"A lot better lately.\"  Affect: less blunted, far more animated and spontaneous; was congruent to mood; was congruent to content  Thought Process/Associations: linear, organized  Thought Content:  Reports consistent with interim hx above;  Denies preoccupations, obsessions , magical thinking and paranoid ideation  Perception:  Reports none;  Denies auditory hallucinations and visual hallucinations  Insight: adequate  Judgment: " "adequate for safety  Cognition: (6) does  appear grossly intact; formal cognitive testing was not done  Gait and Station: unremarkable    LABS and DATA     AIMS:  not done at this time    PHQ9 TODAY = 6     PHQ-9 SCORE 4/5/2019 4/16/2019 4/30/2019   PHQ-9 Total Score 13 14 8      EKG QTc = 429 on 02/08/19    PSYCHIATRIC DIAGNOSES                                                                                               Major Depressive Disorder, Recurrent, Severe; with h/o psychotic features (psychotic fx resolved now)     ASSESSMENT                                                                                                          m2, h3     Madhu Gibson is a 23-year-old  male with a history of childhood onset depressive symptoms who presents for evaluation of psychotic symptoms that emerged following a suicide attempt approximately 13 months ago. Per original evaluation:    He was referred by his primary outpatient psychiatrist in [place] for second opinion/consultation.  Patient has a long history of depressive symptoms including anergia, anhedonia, low mood, and sleep disturbances, along with anxious rumination.  The symptoms evolved to include psychotic symptoms that have lingered despite treatment with combination of antidepressant and antipsychotic meds.  He apparently had limited to no response with aripiprazole and has only had a very small improvements, if any, with recent change to olanzapine. He did notice a modest improvement when on lower dose of olanzapine and aripiprazole (amidst cross-titration). He is also describing suicidal thoughts, sometimes active with a plan to stab himself, because he is \"tired of feeling empty all the time.\"  His family history is noted for several mood disorders including borderline, depression, and bipolar disorder. He does not have any medical comorbidities. On exam, he presents as very flat, withdrawn, and depressed. His cognition, mood is slowed, is " linear, organized, and goal-directed.    Today    Returns for follow-up looking more animated and spontaneous than I have ever seen him. Talking about future goals, job hunting. Has been more social lately and riding his bike. No SEs from VLFX dose increase about 1 month ago.    Will continue current treatment plan. Limited psych med history, so several options available. Madhu looked severely depressed when we first met, and at that time we were considering ECT. Is much improved with high-dose VLFX.    [place] PRESCRIPTION MONITORING PROGRAM [] was checked today:  not using controlled substances.    PSYCHOTROPIC DRUG INTERACTIONS:   Concurrent use of ESCITALOPRAM and QT INTERVAL-PROLONGING DRUGS may result in increased risk of QT-interval prolongation. .  MANAGEMENT:  periodic EKGs     PLAN                                                                                                                        m2, h3     1) PSYCHOTROPIC MEDICATIONS:  - Continue venlafaxine XL 450mg daily    2) THERAPY: continue CBT for depression with [facility] therapist Vincent    3) NEXT DUE:    Labs- no  EKG- QTc = 429 on 2/8/19  Rating Scales-     4) REFERRALS: Referred to [facility] for ongoing psychiatric care; will see Dr. Holley (PGY3) in interim    5) RTC: 4-6 weeks    6) CRISIS NUMBERS:   Provided routinely in AVS.    ONLY if a [facility]: [facility] [place] [facility] [phone] (clinic), [phone] (after hours)     TREATMENT RISK STATEMENT:  The risks, benefits, alternatives and potential adverse effects have been discussed and   are understood by the pt. The pt understands the risks of using street drugs or alcohol. There are no medical contraindications, the pt agrees to treatment with the ability to do so. The pt knows to call the clinic for any problems or to access emergency care if needed.  Medical and substance use concerns are documented above.  Psychotropic drug interaction check was done, including changes made  today.    PROVIDER: uHber Mendoza MD    Not staffed in clinic. Will route to Dr. Richard to review/sign.    Attestation:  I did not see Madhu Gibson directly. I have reviewed the documentation and I agree with the resident's plan of care.   Olayinka Richard MD

## 2019-06-13 ENCOUNTER — TELEPHONE (OUTPATIENT)
Dept: PSYCHIATRY | Facility: CLINIC | Age: 24
End: 2019-06-13

## 2019-06-13 NOTE — TELEPHONE ENCOUNTER
----- Message from Huber Mendoza MD sent at 6/11/2019  9:06 AM CDT -----  Regarding: RE: Patient call  Contact: [phone]  He's from [place] and had been commuting to see us. I recommended he transfer to [facility] when I graduate.    He may want help transferring records to them, but other than, that I'm not sure. Let me know if he needs something else that I can help with.    Florinda,    Edgard    ----- Message -----  From: Vickey Gardner RN  Sent: 6/10/2019   4:39 PM  To: Huber Mendoza MD, #  Subject: FW: Patient call                                 Dr. Mendoza:    Do you know what this is about?    Steve    ----- Message -----  From: Juan Cool  Sent: 6/10/2019  12:44 PM  To: Katja Carrasquillo RN  Subject: Patient call                                     Good morning Dr. Mendoza!  Patient called today, and stated that he needs a call, due to needing to transfer to the [facility].       Writer called pt to explore his concerns. He reported that he contacted the clinic and they are not taking outside patients. He requested that Dr. Mendoza provide any assistance with this that he can.  Writer contacted [facility] ([phone]) and Mackenzie identified that they are taking no outside referrals, only referrals from [facility] providers and they are booked out until December.  [email or web address]    Routed to provider.

## 2019-06-14 NOTE — TELEPHONE ENCOUNTER
Writer conferred with Dr. Mendoza , who reported that he coordinated with [facility] to have pt transferred to Dr. Nicholas Devine and that the pt could contact the clinic for scheduling and ask to speak to  Margret Porter.  Writer called pt, reviewed information, and provided clinic number. Pt identified understanding.

## 2019-06-18 ENCOUNTER — TELEPHONE (OUTPATIENT)
Dept: PSYCHIATRY | Facility: CLINIC | Age: 24
End: 2019-06-18

## 2019-06-18 NOTE — TELEPHONE ENCOUNTER
"Ronnie Rowley Laura, RN   Phone Number: [phone]             Patient called with the following message:     \"I am trying to apply for disability through the state. I need medical evidence of depression and psychosis to prove it, and get on disability\"     Send it to [address] [place], [address] (Patient home address)            Writer called pt to gather more information. Pt is unsure exactly what \"medical evidence\" means; whether this means clinic notes or just a letter with a diagnosis. Writer provided general clinic email and asked that he email the form he received to this writer for further evaluation. At that time, writer will send evidence to pt via mail or email. Will have pt complete CHIDI as needed also.   "

## 2019-06-19 NOTE — TELEPHONE ENCOUNTER
Received scanned form from pt via email. Called pt and confirmed he would still like his records emailed to him and then he will bring it to the social security office for completion of the application process.     Writer asked pt to complete general email consent form, so writer can email records to pt. He agreed to this and asked that the blank form is emailed to him at [email or web address]. Pt will complete this and then email it back to the clinic. At that time, writer will release his records via email.

## 2019-06-25 NOTE — TELEPHONE ENCOUNTER
Received completed email consent form from the pt. Office visit notes emailed to pt at the address in the previous message.     Consent form labeled and copied. Copy held at writer's desk and original placed in scanning.

## 2021-07-27 NOTE — PATIENT INSTRUCTIONS
- Continue venlafaxine XL to 300mg, but increase to 450mg in 1 week. Monitor for worsening headaches, agitation/irritability, sleep changes.  - Discontinue escitalopram to 5mg  - Discontinue metformin  - Follow-up with me in 1 month  
numerical 0-10